# Patient Record
Sex: FEMALE | Race: WHITE | NOT HISPANIC OR LATINO | Employment: UNEMPLOYED | URBAN - METROPOLITAN AREA
[De-identification: names, ages, dates, MRNs, and addresses within clinical notes are randomized per-mention and may not be internally consistent; named-entity substitution may affect disease eponyms.]

---

## 2021-05-12 ENCOUNTER — HOSPITAL ENCOUNTER (EMERGENCY)
Facility: HOSPITAL | Age: 31
Discharge: HOME | End: 2021-05-12
Attending: EMERGENCY MEDICINE
Payer: COMMERCIAL

## 2021-05-12 VITALS
OXYGEN SATURATION: 99 % | RESPIRATION RATE: 20 BRPM | HEIGHT: 62 IN | BODY MASS INDEX: 25.98 KG/M2 | SYSTOLIC BLOOD PRESSURE: 128 MMHG | WEIGHT: 141.19 LBS | HEART RATE: 76 BPM | TEMPERATURE: 99 F | DIASTOLIC BLOOD PRESSURE: 78 MMHG

## 2021-05-12 DIAGNOSIS — R10.11 RIGHT UPPER QUADRANT ABDOMINAL PAIN: Primary | ICD-10-CM

## 2021-05-12 DIAGNOSIS — R10.9 RIGHT SIDED ABDOMINAL PAIN: ICD-10-CM

## 2021-05-12 DIAGNOSIS — R07.9 RIGHT-SIDED CHEST PAIN: ICD-10-CM

## 2021-05-12 LAB
ALBUMIN SERPL BCP-MCNC: 4.3 G/DL (ref 3.5–5.2)
ALP SERPL-CCNC: 65 U/L (ref 55–135)
ALT SERPL W/O P-5'-P-CCNC: 13 U/L (ref 10–44)
ANION GAP SERPL CALC-SCNC: 11 MMOL/L (ref 8–16)
AST SERPL-CCNC: 15 U/L (ref 10–40)
B-HCG UR QL: NEGATIVE
BASOPHILS # BLD AUTO: 0.02 K/UL (ref 0–0.2)
BASOPHILS NFR BLD: 0.3 % (ref 0–1.9)
BILIRUB SERPL-MCNC: 0.9 MG/DL (ref 0.1–1)
BILIRUB UR QL STRIP: NEGATIVE
BUN SERPL-MCNC: 10 MG/DL (ref 6–20)
CALCIUM SERPL-MCNC: 8.9 MG/DL (ref 8.7–10.5)
CHLORIDE SERPL-SCNC: 107 MMOL/L (ref 95–110)
CLARITY UR: CLEAR
CO2 SERPL-SCNC: 21 MMOL/L (ref 23–29)
COLOR UR: YELLOW
CREAT SERPL-MCNC: 0.8 MG/DL (ref 0.5–1.4)
DIFFERENTIAL METHOD: NORMAL
EOSINOPHIL # BLD AUTO: 0.1 K/UL (ref 0–0.5)
EOSINOPHIL NFR BLD: 0.9 % (ref 0–8)
ERYTHROCYTE [DISTWIDTH] IN BLOOD BY AUTOMATED COUNT: 12.4 % (ref 11.5–14.5)
EST. GFR  (AFRICAN AMERICAN): >60 ML/MIN/1.73 M^2
EST. GFR  (NON AFRICAN AMERICAN): >60 ML/MIN/1.73 M^2
GLUCOSE SERPL-MCNC: 84 MG/DL (ref 70–110)
GLUCOSE UR QL STRIP: NEGATIVE
HCT VFR BLD AUTO: 38.8 % (ref 37–48.5)
HCV AB SERPL QL IA: NEGATIVE
HEP C VIRUS HOLD SPECIMEN: NORMAL
HGB BLD-MCNC: 12.5 G/DL (ref 12–16)
HGB UR QL STRIP: NEGATIVE
HIV 1+2 AB+HIV1 P24 AG SERPL QL IA: NEGATIVE
IMM GRANULOCYTES # BLD AUTO: 0.03 K/UL (ref 0–0.04)
IMM GRANULOCYTES NFR BLD AUTO: 0.5 % (ref 0–0.5)
KETONES UR QL STRIP: NEGATIVE
LEUKOCYTE ESTERASE UR QL STRIP: NEGATIVE
LIPASE SERPL-CCNC: 16 U/L (ref 4–60)
LYMPHOCYTES # BLD AUTO: 3.1 K/UL (ref 1–4.8)
LYMPHOCYTES NFR BLD: 47.6 % (ref 18–48)
MCH RBC QN AUTO: 29.6 PG (ref 27–31)
MCHC RBC AUTO-ENTMCNC: 32.2 G/DL (ref 32–36)
MCV RBC AUTO: 92 FL (ref 82–98)
MONOCYTES # BLD AUTO: 0.4 K/UL (ref 0.3–1)
MONOCYTES NFR BLD: 5.7 % (ref 4–15)
NEUTROPHILS # BLD AUTO: 2.9 K/UL (ref 1.8–7.7)
NEUTROPHILS NFR BLD: 45 % (ref 38–73)
NITRITE UR QL STRIP: NEGATIVE
NRBC BLD-RTO: 0 /100 WBC
PH UR STRIP: 7 [PH] (ref 5–8)
PLATELET # BLD AUTO: 257 K/UL (ref 150–450)
PMV BLD AUTO: 9.7 FL (ref 9.2–12.9)
POTASSIUM SERPL-SCNC: 3.9 MMOL/L (ref 3.5–5.1)
PROT SERPL-MCNC: 7.4 G/DL (ref 6–8.4)
PROT UR QL STRIP: NEGATIVE
RBC # BLD AUTO: 4.22 M/UL (ref 4–5.4)
SODIUM SERPL-SCNC: 139 MMOL/L (ref 136–145)
SP GR UR STRIP: <=1.005 (ref 1–1.03)
URN SPEC COLLECT METH UR: ABNORMAL
UROBILINOGEN UR STRIP-ACNC: NEGATIVE EU/DL
WBC # BLD AUTO: 6.51 K/UL (ref 3.9–12.7)

## 2021-05-12 PROCEDURE — 99285 EMERGENCY DEPT VISIT HI MDM: CPT | Mod: 25

## 2021-05-12 PROCEDURE — 86803 HEPATITIS C AB TEST: CPT | Performed by: EMERGENCY MEDICINE

## 2021-05-12 PROCEDURE — 25000003 PHARM REV CODE 250: Performed by: EMERGENCY MEDICINE

## 2021-05-12 PROCEDURE — 80053 COMPREHEN METABOLIC PANEL: CPT | Performed by: EMERGENCY MEDICINE

## 2021-05-12 PROCEDURE — 81025 URINE PREGNANCY TEST: CPT | Performed by: EMERGENCY MEDICINE

## 2021-05-12 PROCEDURE — 81003 URINALYSIS AUTO W/O SCOPE: CPT | Performed by: EMERGENCY MEDICINE

## 2021-05-12 PROCEDURE — 36415 COLL VENOUS BLD VENIPUNCTURE: CPT | Performed by: EMERGENCY MEDICINE

## 2021-05-12 PROCEDURE — 83690 ASSAY OF LIPASE: CPT | Performed by: EMERGENCY MEDICINE

## 2021-05-12 PROCEDURE — 86703 HIV-1/HIV-2 1 RESULT ANTBDY: CPT | Performed by: EMERGENCY MEDICINE

## 2021-05-12 PROCEDURE — 85025 COMPLETE CBC W/AUTO DIFF WBC: CPT | Performed by: EMERGENCY MEDICINE

## 2021-05-12 RX ORDER — ONDANSETRON 2 MG/ML
4 INJECTION INTRAMUSCULAR; INTRAVENOUS
Status: DISCONTINUED | OUTPATIENT
Start: 2021-05-12 | End: 2021-05-12 | Stop reason: HOSPADM

## 2021-05-12 RX ORDER — ACETAMINOPHEN 500 MG
1000 TABLET ORAL
Status: COMPLETED | OUTPATIENT
Start: 2021-05-12 | End: 2021-05-12

## 2021-05-12 RX ORDER — MORPHINE SULFATE 4 MG/ML
4 INJECTION, SOLUTION INTRAMUSCULAR; INTRAVENOUS
Status: DISCONTINUED | OUTPATIENT
Start: 2021-05-12 | End: 2021-05-12 | Stop reason: HOSPADM

## 2021-05-12 RX ORDER — NAPROXEN 375 MG/1
375 TABLET ORAL 2 TIMES DAILY WITH MEALS
Qty: 60 TABLET | Refills: 0 | Status: SHIPPED | OUTPATIENT
Start: 2021-05-12 | End: 2021-05-12 | Stop reason: SDUPTHER

## 2021-05-12 RX ORDER — IBUPROFEN 800 MG/1
800 TABLET ORAL
Status: COMPLETED | OUTPATIENT
Start: 2021-05-12 | End: 2021-05-12

## 2021-05-12 RX ORDER — NAPROXEN 375 MG/1
375 TABLET ORAL 2 TIMES DAILY WITH MEALS
Qty: 60 TABLET | Refills: 0 | Status: SHIPPED | OUTPATIENT
Start: 2021-05-12 | End: 2021-11-15

## 2021-05-12 RX ADMIN — IBUPROFEN 800 MG: 800 TABLET ORAL at 02:05

## 2021-05-12 RX ADMIN — ACETAMINOPHEN 1000 MG: 500 TABLET ORAL at 02:05

## 2021-11-14 RX ORDER — METHOCARBAMOL 750 MG/1
750 TABLET, FILM COATED ORAL EVERY 8 HOURS PRN
COMMUNITY
Start: 2021-07-22 | End: 2023-02-08 | Stop reason: CLARIF

## 2021-11-14 RX ORDER — TIZANIDINE 4 MG/1
4 TABLET ORAL NIGHTLY
COMMUNITY
Start: 2021-06-07 | End: 2023-02-08 | Stop reason: CLARIF

## 2021-11-14 RX ORDER — GABAPENTIN 300 MG/1
600 CAPSULE ORAL 3 TIMES DAILY
COMMUNITY
Start: 2021-06-07 | End: 2023-02-08 | Stop reason: CLARIF

## 2021-11-14 RX ORDER — CHLORZOXAZONE 500 MG/1
500 TABLET ORAL EVERY 8 HOURS PRN
COMMUNITY
Start: 2021-06-07 | End: 2023-02-08 | Stop reason: CLARIF

## 2021-11-14 RX ORDER — OXYCODONE AND ACETAMINOPHEN 5; 325 MG/1; MG/1
1 TABLET ORAL EVERY 6 HOURS PRN
COMMUNITY
Start: 2021-07-14 | End: 2023-02-08 | Stop reason: CLARIF

## 2021-11-14 RX ORDER — CELECOXIB 100 MG/1
CAPSULE ORAL
COMMUNITY
Start: 2021-06-07 | End: 2021-11-15

## 2023-02-07 ENCOUNTER — NURSE TRIAGE (OUTPATIENT)
Dept: ADMINISTRATIVE | Facility: CLINIC | Age: 33
End: 2023-02-07
Payer: COMMERCIAL

## 2023-02-07 NOTE — TELEPHONE ENCOUNTER
"Pt calls c/o upper right quadrant abd pain and upper back pain for the past 10 days. Pt states, "This pain just isn't going away and I'm not sure what to do." Pt describes the pain as "starting in my upper right abdomen and radiating to my upper back, almost like where my kidney is. I've had kidney stones before, but this doesn't feel like that." Pt denies chest pain and SOB. +Family hx of heart disease.    Care Advice recommends that pt go to ED now. Pt verbalizes understanding and is instructed to call back with any new/worsening sxs, questions, or concerns.   Reason for Disposition   Pain lasting > 10 minutes and over 35 years old and at least one cardiac risk factor (i.e., hypertension, diabetes, obesity, smoker or strong family history of heart disease)    Additional Information   Negative: Passed out (i.e., fainted, collapsed and was not responding)   Negative: Shock suspected (e.g., cold/pale/clammy skin, too weak to stand, low BP, rapid pulse)   Negative: Sounds like a life-threatening emergency to the triager   Negative: Chest pain   Pain is mainly in upper abdomen (if needed ask: 'is it mainly above the belly button?')   Negative: Passed out (i.e., fainted, collapsed and was not responding)   Negative: Shock suspected (e.g., cold/pale/clammy skin, too weak to stand, low BP, rapid pulse)   Negative: Visible sweat on face or sweat is dripping down   Negative: SEVERE abdominal pain (e.g., excruciating)   Negative: Pain lasting > 10 minutes and over 50 years old   Negative: Pain lasting > 10 minutes and over 40 years old and associated chest, arm, neck, upper back, or jaw pain    Protocols used: Abdominal Pain - Female-A-OH, Abdominal Pain - Upper-A-OH    "

## 2023-02-08 ENCOUNTER — HOSPITAL ENCOUNTER (EMERGENCY)
Facility: HOSPITAL | Age: 33
Discharge: HOME | End: 2023-02-08
Attending: EMERGENCY MEDICINE
Payer: COMMERCIAL

## 2023-02-08 VITALS
WEIGHT: 149.13 LBS | SYSTOLIC BLOOD PRESSURE: 125 MMHG | BODY MASS INDEX: 27.28 KG/M2 | OXYGEN SATURATION: 98 % | TEMPERATURE: 98 F | HEART RATE: 70 BPM | RESPIRATION RATE: 16 BRPM | DIASTOLIC BLOOD PRESSURE: 79 MMHG

## 2023-02-08 DIAGNOSIS — M54.9 UPPER BACK PAIN: ICD-10-CM

## 2023-02-08 DIAGNOSIS — R10.11 RIGHT UPPER QUADRANT ABDOMINAL PAIN: Primary | ICD-10-CM

## 2023-02-08 LAB
ALBUMIN SERPL BCP-MCNC: 4.2 G/DL (ref 3.5–5.2)
ALP SERPL-CCNC: 85 U/L (ref 55–135)
ALT SERPL W/O P-5'-P-CCNC: 19 U/L (ref 10–44)
ANION GAP SERPL CALC-SCNC: 7 MMOL/L (ref 8–16)
AST SERPL-CCNC: 17 U/L (ref 10–40)
B-HCG UR QL: NEGATIVE
BASOPHILS # BLD AUTO: 0.03 K/UL (ref 0–0.2)
BASOPHILS NFR BLD: 0.4 % (ref 0–1.9)
BILIRUB SERPL-MCNC: 0.3 MG/DL (ref 0.1–1)
BILIRUB UR QL STRIP: NEGATIVE
BUN SERPL-MCNC: 16 MG/DL (ref 6–20)
CALCIUM SERPL-MCNC: 9.5 MG/DL (ref 8.7–10.5)
CHLORIDE SERPL-SCNC: 106 MMOL/L (ref 95–110)
CLARITY UR: CLEAR
CO2 SERPL-SCNC: 26 MMOL/L (ref 23–29)
COLOR UR: YELLOW
CREAT SERPL-MCNC: 0.8 MG/DL (ref 0.5–1.4)
DIFFERENTIAL METHOD: NORMAL
EOSINOPHIL # BLD AUTO: 0 K/UL (ref 0–0.5)
EOSINOPHIL NFR BLD: 0.6 % (ref 0–8)
ERYTHROCYTE [DISTWIDTH] IN BLOOD BY AUTOMATED COUNT: 12 % (ref 11.5–14.5)
EST. GFR  (NO RACE VARIABLE): >60 ML/MIN/1.73 M^2
GLUCOSE SERPL-MCNC: 78 MG/DL (ref 70–110)
GLUCOSE UR QL STRIP: NEGATIVE
HCT VFR BLD AUTO: 40.8 % (ref 37–48.5)
HGB BLD-MCNC: 13.3 G/DL (ref 12–16)
HGB UR QL STRIP: NEGATIVE
IMM GRANULOCYTES # BLD AUTO: 0.02 K/UL (ref 0–0.04)
IMM GRANULOCYTES NFR BLD AUTO: 0.3 % (ref 0–0.5)
KETONES UR QL STRIP: NEGATIVE
LEUKOCYTE ESTERASE UR QL STRIP: NEGATIVE
LIPASE SERPL-CCNC: 25 U/L (ref 4–60)
LYMPHOCYTES # BLD AUTO: 3 K/UL (ref 1–4.8)
LYMPHOCYTES NFR BLD: 43.6 % (ref 18–48)
MCH RBC QN AUTO: 29.4 PG (ref 27–31)
MCHC RBC AUTO-ENTMCNC: 32.6 G/DL (ref 32–36)
MCV RBC AUTO: 90 FL (ref 82–98)
MONOCYTES # BLD AUTO: 0.4 K/UL (ref 0.3–1)
MONOCYTES NFR BLD: 6.4 % (ref 4–15)
NEUTROPHILS # BLD AUTO: 3.3 K/UL (ref 1.8–7.7)
NEUTROPHILS NFR BLD: 48.7 % (ref 38–73)
NITRITE UR QL STRIP: NEGATIVE
NRBC BLD-RTO: 0 /100 WBC
PH UR STRIP: 8 [PH] (ref 5–8)
PLATELET # BLD AUTO: 309 K/UL (ref 150–450)
PMV BLD AUTO: 9.3 FL (ref 9.2–12.9)
POTASSIUM SERPL-SCNC: 4.2 MMOL/L (ref 3.5–5.1)
PROT SERPL-MCNC: 7.5 G/DL (ref 6–8.4)
PROT UR QL STRIP: NEGATIVE
RBC # BLD AUTO: 4.53 M/UL (ref 4–5.4)
SODIUM SERPL-SCNC: 139 MMOL/L (ref 136–145)
SP GR UR STRIP: 1.01 (ref 1–1.03)
URN SPEC COLLECT METH UR: NORMAL
UROBILINOGEN UR STRIP-ACNC: NEGATIVE EU/DL
WBC # BLD AUTO: 6.85 K/UL (ref 3.9–12.7)

## 2023-02-08 PROCEDURE — 80053 COMPREHEN METABOLIC PANEL: CPT | Performed by: NURSE PRACTITIONER

## 2023-02-08 PROCEDURE — 81025 URINE PREGNANCY TEST: CPT | Performed by: NURSE PRACTITIONER

## 2023-02-08 PROCEDURE — 25000003 PHARM REV CODE 250: Performed by: NURSE PRACTITIONER

## 2023-02-08 PROCEDURE — 85025 COMPLETE CBC W/AUTO DIFF WBC: CPT | Performed by: NURSE PRACTITIONER

## 2023-02-08 PROCEDURE — 83690 ASSAY OF LIPASE: CPT | Performed by: NURSE PRACTITIONER

## 2023-02-08 PROCEDURE — 81003 URINALYSIS AUTO W/O SCOPE: CPT | Performed by: NURSE PRACTITIONER

## 2023-02-08 PROCEDURE — 99284 EMERGENCY DEPT VISIT MOD MDM: CPT | Mod: 25

## 2023-02-08 RX ORDER — IBUPROFEN 600 MG/1
600 TABLET ORAL EVERY 6 HOURS PRN
Qty: 28 TABLET | Refills: 0 | Status: SHIPPED | OUTPATIENT
Start: 2023-02-08

## 2023-02-08 RX ORDER — METHOCARBAMOL 500 MG/1
500 TABLET, FILM COATED ORAL 3 TIMES DAILY PRN
Qty: 30 TABLET | Refills: 0 | Status: SHIPPED | OUTPATIENT
Start: 2023-02-08

## 2023-02-08 RX ORDER — ONDANSETRON 4 MG/1
4 TABLET, ORALLY DISINTEGRATING ORAL
Status: COMPLETED | OUTPATIENT
Start: 2023-02-08 | End: 2023-02-08

## 2023-02-08 RX ADMIN — ONDANSETRON 4 MG: 4 TABLET, ORALLY DISINTEGRATING ORAL at 09:02

## 2023-02-08 NOTE — ED PROVIDER NOTES
"SCRIBE #1 NOTE: I, Irma Magaña, am scribing for, and in the presence of, Doris Hernandez DO. I have scribed the entire note.      History      Chief Complaint   Patient presents with    Abdominal Pain     Pt reports upper abdominal pain that radiates to her upper back x 11 days. Pt also reports nausea and "fluffy" stools.        Review of patient's allergies indicates:  No Known Allergies     HPI   HPI    2/8/2023, 11:16 AM   History obtained from the patient      History of Present Illness: Anusha Mckeon is a 32 y.o. female patient who presents to the Emergency Department for dull, RUQ abdominal pain which onset 11 days PTA and has been constant for 3 days. Pt initially believed that she had a stomach virus because the rest of her family had one, but she states that she did not have N/V/D like the rest of her family and her sxs have not improved. The pt reports that she has had to have bowel movements more frequently and that her stools are mucousy and fluffy, but she denies experiencing any diarrhea. She also denies experiencing any sharp, stabbing, ripping, or tearing pain in her abdomen. Symptoms are constant and moderate in severity. No mitigating or exacerbating factors reported. Associated sxs include mucousy stools and R upper back pain that radiates down her RUE. Patient denies any fever, chills, cough, SOB, leg swelling, dysuria, hematuria, N/V/D, and all other sxs at this time. No prior Tx reported. Pt reports that her LNMP was 3 weeks ago. She also reports that her mother has a history of diverticulitis. No further complaints or concerns at this time.   Patient is currently breast feeding.         Arrival mode: Personal vehicle      PCP: Shahzad Forte MD       Past Medical History:  Past Medical History:   Diagnosis Date    Abnormal glucose tolerance of mother affecting pregnancy, childbirth, or puerperium 9/16/2021 10:47:13 AM    [facility] Historical - Unknown: " Gestational diabetes mellitus (GDM)-No Additional Notes    Ovarian cyst     Left ovary       Past Surgical History:  Past Surgical History:   Procedure Laterality Date    CARPAL TUNNEL RELEASE      CERVICAL DISC SURGERY      CYST REMOVAL      WRIST SURGERY           Family History:  Family History   Problem Relation Age of Onset    Diabetes Paternal Grandmother     Hypertension Paternal Grandmother     Ovarian cancer Maternal Grandmother     Cancer Maternal Grandmother         skin    Hypertension Maternal Grandmother     Stroke Maternal Grandmother     Breast cancer Neg Hx     Colon cancer Neg Hx     Eclampsia Neg Hx     Miscarriages / Stillbirths Neg Hx      labor Neg Hx        Social History:  Social History     Tobacco Use    Smoking status: Never    Smokeless tobacco: Never   Substance and Sexual Activity    Alcohol use: Yes     Comment: socially     Drug use: No    Sexual activity: Yes     Partners: Male     Birth control/protection: None       ROS   Review of Systems   Constitutional:  Negative for chills and fever.   HENT:  Negative for sore throat.    Respiratory:  Negative for cough and shortness of breath.    Cardiovascular:  Negative for chest pain and leg swelling.   Gastrointestinal:  Positive for abdominal pain (RUQ). Negative for diarrhea, nausea and vomiting.        (+) mucousy stools   Genitourinary:  Negative for dysuria and hematuria.   Musculoskeletal:  Positive for back pain (R upper radiating down the RUE) and myalgias (RUE pain radiating from the back).   Skin:  Negative for rash.   Neurological:  Negative for weakness.   Hematological:  Does not bruise/bleed easily.   All other systems reviewed and are negative.    Physical Exam      Initial Vitals [23 0805]   BP Pulse Resp Temp SpO2   125/79 75 18 97.8 °F (36.6 °C) (!) 74 %      MAP       --          Physical Exam  Skin:           Nursing Notes and Vital Signs Reviewed.  Constitutional: Patient is in no acute distress.  Well-developed and well-nourished.  Head: Atraumatic. Normocephalic.  Eyes: PERRL. EOM intact. Conjunctivae are not pale. No scleral icterus.  ENT: Mucous membranes are moist. Oropharynx is clear and symmetric.    Neck: Supple. Full ROM. No lymphadenopathy.  Cardiovascular: Regular rate. Regular rhythm. No murmurs, rubs, or gallops. Distal pulses are 2+ and symmetric.  Pulmonary/Chest: No respiratory distress. Clear to auscultation bilaterally. No wheezing or rales.  Abdominal: Soft and non-distended.  There is no tenderness.  No rebound, guarding, or rigidity.   Musculoskeletal: Moves all extremities. No obvious deformities. No edema.  Skin: Warm and dry.  Neurological:  Alert, awake, and appropriate.  Normal speech.  No acute focal neurological deficits are appreciated.  Intact median, ulnar, and radial nerves both motor and sensory  Psychiatric: Normal affect. Good eye contact. Appropriate in content.    ED Course    Procedures  ED Vital Signs:  Vitals:    02/08/23 0805 02/08/23 1132   BP: 125/79    Pulse: 75 70   Resp: 18 16   Temp: 97.8 °F (36.6 °C)    TempSrc: Oral    SpO2: (!) 74% 98%   Weight: 67.7 kg (149 lb 2.3 oz)        Abnormal Lab Results:  Labs Reviewed   COMPREHENSIVE METABOLIC PANEL - Abnormal; Notable for the following components:       Result Value    Anion Gap 7 (*)     All other components within normal limits   CBC W/ AUTO DIFFERENTIAL   LIPASE   URINALYSIS, REFLEX TO URINE CULTURE    Narrative:     Specimen Source->Urine   PREGNANCY TEST, URINE RAPID    Narrative:     Specimen Source->Urine        All Lab Results:  Results for orders placed or performed during the hospital encounter of 02/08/23   CBC auto differential   Result Value Ref Range    WBC 6.85 3.90 - 12.70 K/uL    RBC 4.53 4.00 - 5.40 M/uL    Hemoglobin 13.3 12.0 - 16.0 g/dL    Hematocrit 40.8 37.0 - 48.5 %    MCV 90 82 - 98 fL    MCH 29.4 27.0 - 31.0 pg    MCHC 32.6 32.0 - 36.0 g/dL    RDW 12.0 11.5 - 14.5 %    Platelets 309 150 -  450 K/uL    MPV 9.3 9.2 - 12.9 fL    Immature Granulocytes 0.3 0.0 - 0.5 %    Gran # (ANC) 3.3 1.8 - 7.7 K/uL    Immature Grans (Abs) 0.02 0.00 - 0.04 K/uL    Lymph # 3.0 1.0 - 4.8 K/uL    Mono # 0.4 0.3 - 1.0 K/uL    Eos # 0.0 0.0 - 0.5 K/uL    Baso # 0.03 0.00 - 0.20 K/uL    nRBC 0 0 /100 WBC    Gran % 48.7 38.0 - 73.0 %    Lymph % 43.6 18.0 - 48.0 %    Mono % 6.4 4.0 - 15.0 %    Eosinophil % 0.6 0.0 - 8.0 %    Basophil % 0.4 0.0 - 1.9 %    Differential Method Automated    Comprehensive metabolic panel   Result Value Ref Range    Sodium 139 136 - 145 mmol/L    Potassium 4.2 3.5 - 5.1 mmol/L    Chloride 106 95 - 110 mmol/L    CO2 26 23 - 29 mmol/L    Glucose 78 70 - 110 mg/dL    BUN 16 6 - 20 mg/dL    Creatinine 0.8 0.5 - 1.4 mg/dL    Calcium 9.5 8.7 - 10.5 mg/dL    Total Protein 7.5 6.0 - 8.4 g/dL    Albumin 4.2 3.5 - 5.2 g/dL    Total Bilirubin 0.3 0.1 - 1.0 mg/dL    Alkaline Phosphatase 85 55 - 135 U/L    AST 17 10 - 40 U/L    ALT 19 10 - 44 U/L    Anion Gap 7 (L) 8 - 16 mmol/L    eGFR >60 >60 mL/min/1.73 m^2   Lipase   Result Value Ref Range    Lipase 25 4 - 60 U/L   Urinalysis, Reflex to Urine Culture Urine, Clean Catch    Specimen: Urine   Result Value Ref Range    Specimen UA Urine, Clean Catch     Color, UA Yellow Yellow, Straw, Mireille    Appearance, UA Clear Clear    pH, UA 8.0 5.0 - 8.0    Specific Gravity, UA 1.015 1.005 - 1.030    Protein, UA Negative Negative    Glucose, UA Negative Negative    Ketones, UA Negative Negative    Bilirubin (UA) Negative Negative    Occult Blood UA Negative Negative    Nitrite, UA Negative Negative    Urobilinogen, UA Negative <2.0 EU/dL    Leukocytes, UA Negative Negative   Pregnancy, urine rapid   Result Value Ref Range    Preg Test, Ur Negative          Imaging Results:  Imaging Results              X-Ray Chest PA And Lateral (Final result)  Result time 02/08/23 11:41:10      Final result by Joe Reid MD (02/08/23 11:41:10)                   Impression:       Clear chest without acute abnormality.  Similar appearance.  Consider further evaluation as warranted.      Electronically signed by: Joe Reid  Date:    02/08/2023  Time:    11:41               Narrative:    EXAMINATION:  XR CHEST PA AND LATERAL    CLINICAL HISTORY:  Dorsalgia, unspecified    TECHNIQUE:  PA and lateral views of the chest were performed.    COMPARISON:  Chest radiograph 05/12/2020    FINDINGS:  The lungs are clear, with normal appearance of pulmonary vasculature and no pleural effusion or pneumothorax.    The cardiac silhouette is normal in size. The hilar and mediastinal contours are unremarkable.    Bones are intact. Probable postoperative changes at the lower cervical spine.                                       US Abdomen Limited (Final result)  Result time 02/08/23 09:03:34      Final result by BAMBI Mackey Sr., MD (02/08/23 09:03:34)                   Impression:      1. There is a small extrarenal pelvis associated with the right kidney.  2. Otherwise, normal study.      Electronically signed by: Joe Mackey MD  Date:    02/08/2023  Time:    09:03               Narrative:    EXAMINATION:  US ABDOMEN LIMITED    CLINICAL HISTORY:  right upper quadrant abdominal pain;    TECHNIQUE:  Multiple static ultrasound images are submitted for interpretation with color flow and spectral Doppler imaging.    COMPARISON:  05/12/2021    FINDINGS:  The liver is normal in appearance. It measures 14.7 cm in length. There is no intrahepatic biliary ductal dilatation. The common bile duct has a diameter of 1 mm. The gallbladder is normal in appearance. The visualized portion of the pancreas is normal in appearance. The right kidney measures 9.7 cm in length.  There is a small extrarenal pelvis associated with the right kidney.  There is no nephrolithiasis.  The inferior vena cava is normal in appearance. The main portal vein has a venous waveform with flow directed towards the liver. It has a  "velocity of 30 cm/sec.                                     ED: Independent evaluation of chest x-ray:  Normal cardiac silhouette.  No pneumothorax.         The Emergency Provider reviewed the vital signs and test results, which are outlined above.    ED Discussion     12:20 PM: Reassessed pt at this time. Discussed with pt all pertinent ED information and results. Discussed pt dx and plan of tx. Gave pt all f/u and return to the ED instructions. All questions and concerns were addressed at this time. Pt expresses understanding of information and instructions, and is comfortable with plan to discharge. Pt is stable for discharge.    I discussed with patient and/or family/caretaker that evaluation in the ED does not suggest any emergent or life threatening medical conditions requiring immediate intervention beyond what was provided in the ED, and I believe patient is safe for discharge.  Regardless, an unremarkable evaluation in the ED does not preclude the development or presence of a serious of life threatening condition. As such, patient was instructed to return immediately for any worsening or change in current symptoms.      ED Course as of 02/08/23 1222   Wed Feb 08, 2023   1059 Nurse Triage Note Reviewed from earlier today:  Note  Pt calls c/o upper right quadrant abd pain and upper back pain for the past 10 days. Pt states, "This pain just isn't going away and I'm not sure what to do." Pt describes the pain as "starting in my upper right abdomen and radiating to my upper back, almost like where my kidney is. I've had kidney stones before, but this doesn't feel like that." Pt denies chest pain and SOB. +Family hx of heart disease.       [LB]   1133 Preg Test, Ur: Negative  Long discussion with patient regarding plan of care.  Patient is currently breastfeeding.  She is afraid to take too much medication as this could affect her breast milk.  We discussed that it would be okay to use ibuprofen.  We recommended " taking ibuprofen over the next 24 to 36 hours.  We also discussed using warm moist heat.. [LB]      ED Course User Index  [LB] Doris Hernandez,        ED Medication(s):  Medications   ondansetron disintegrating tablet 4 mg (4 mg Oral Given 2/8/23 0932)     New Prescriptions    IBUPROFEN (ADVIL,MOTRIN) 600 MG TABLET    Take 1 tablet (600 mg total) by mouth every 6 (six) hours as needed for Pain.        Follow-up Information       Shahzad Forte MD In 2 days.    Specialty: Family Medicine  Why: Return to emergency department for:  Chest pain, chest pressure, vomiting, difficulty breathing, passing out, blood in stool, black tarry stool, or other concerns  Contact information:  [address] [facility] [address] [phone]                               Medical Decision Making    Medical Decision Making:   Clinical Tests:   Lab Tests: Ordered and Reviewed  Radiological Study: Ordered and Reviewed   Additional MDM:     Well's Criteria Score:  -Clinical symptoms of DVT (leg swelling, pain with palpation) = 0.0  -Other diagnosis less likely than pulmonary embolism =            0.0  -Heart Rate >100 =   0.0  -Immobilization (= or > than 3 days) or surgery in the previous 4 weeks = 0.0  -Previous DVT/PE = 0.0  -Hemoptysis =          0.0  -Malignancy =           0.0  Well's Probability Score =    0          Medical Decision Making     Discussed with Independent Historian/Old Records: [x] No.   [] Yes -  see prior documentation.    Comorbid Medical Conditions Considered:  Breast feeding.      Differential Diagnoses: Based on the available information and the initial assessment, the working DIFFERENTIAL DIAGNOSIS considered during this evaluation included, but not limited to:    Biliary Colic, Cholecystitis, Dyspepsia/Gerd, and musculoskeletal pain.                        Xrays:  INDEPENDENT ORDERED and  REVIEWED: [x] Yes       [] No     []  N/A                              INDEPENDENTLY  INTERPRETED:  [x] Yes, see prior documentation      [] No    []  N/A    EKG:    INDEPENDENT ORDERED and  REVIEWED and INDEPENDENTLY INTERPRETED   :  N/A    Notable Abnormal Lab:  Notable abnormal findings from our INDEPENDENT REVIEW of ORDERED LABS include:     Social Determinates: Factored in the Treatment and Disposition of patient included:      ED Course:  Patient complaining right upper abdominal pain that radiates to the upper back.  Been constant for the past 3 days.  No calf pain, calf tenderness, immobilization, no birth control, no prior DVT, no prior PE.  Not associated with shortness of breath.  Not worse with deep breaths.  No fever, blood in stool, black tarry stool, urinary complaints.  Ultrasound negative.  Chest x-ray no infiltrate, wide mediastinum, or pneumothorax.  Urine normal.  Likely musculoskeletal.  Encouraged taking ibuprofen every six-to-eight hours.  Possible addition of Robaxin.  Discussed muscle stretches.    Discussed case with:    Scribe Attestation:   Scribe #1: I performed the above scribed service and the documentation accurately describes the services I performed. I attest to the accuracy of the note.    Attending:   Physician Attestation Statement for Scribe #1: I, Doris Hernandez DO, personally performed the services described in this documentation, as scribed by Irma Magaña, in my presence, and it is both accurate and complete.          Clinical Impression       ICD-10-CM ICD-9-CM   1. Right upper quadrant abdominal pain  R10.11 789.01   2. Upper back pain  M54.9 724.5       Disposition:   Disposition: Discharged  Condition: Stable     Doris Hernandez DO  02/08/23 1306

## 2023-02-08 NOTE — Clinical Note
"Anusha Dukespablo Mckeon was seen and treated in our emergency department on 2/8/2023.  She may return to work on 02/09/2023.       If you have any questions or concerns, please don't hesitate to call.      Doris Hernandez, DO"

## 2023-02-08 NOTE — FIRST PROVIDER EVALUATION
Medical screening examination initiated.  I have conducted a focused provider triage encounter, findings are as follows:    Brief history of present illness:  Patient presents with right upper quadrant pain radiating to the right upper back.  Also reports nausea but denies any vomiting.  Denies any urinary symptoms.    Vitals:    02/08/23 0805   BP: 125/79   BP Location: Right arm   Patient Position: Sitting   Pulse: 75   Resp: 18   Temp: 97.8 °F (36.6 °C)   TempSrc: Oral   SpO2: (!) 74%   Weight: 67.7 kg (149 lb 2.3 oz)       Pertinent physical exam:      Brief workup plan:      Preliminary workup initiated; this workup will be continued and followed by the physician or advanced practice provider that is assigned to the patient when roomed.

## 2023-03-17 ENCOUNTER — NURSE TRIAGE (OUTPATIENT)
Dept: ADMINISTRATIVE | Facility: CLINIC | Age: 33
End: 2023-03-17
Payer: COMMERCIAL

## 2023-03-17 NOTE — TELEPHONE ENCOUNTER
Patient states she was grocery shopping while wearing her 20 lb child in a sling on the front of her chest. She bent forward to  dog food and place in the cart; at the same time, her child made a sudden movement. This caused a popping sensation in the center of her spine. Now she is experiencing severe pain that starts in the center of her back and radiates up towards her neck. She states she is able to turn her neck, but is not able to twist or bend. Taking a deep breath also hurts. She has taken extra strength Tylenol as well as ibuprofen with no relief of symptoms. Care advice is to be seen today in the office; no PCP with [facility]. Advised to contact PCP for appt or go to [facility].     Reason for Disposition   SEVERE back pain (e.g., excruciating, unable to do any normal activities) and not improved after pain medicine and CARE ADVICE    Additional Information   Negative: Passed out (i.e., fainted, collapsed and was not responding)   Negative: Shock suspected (e.g., cold/pale/clammy skin, too weak to stand, low BP, rapid pulse)   Negative: Sounds like a life-threatening emergency to the triager   Negative: SEVERE back pain of sudden onset and age > 60 years   Negative: SEVERE abdominal pain (e.g., excruciating)   Negative: Abdominal pain and age > 60 years   Negative: Unable to urinate (or only a few drops) and bladder feels very full   Negative: Loss of bladder or bowel control (urine or bowel incontinence; wetting self, leaking stool) of new-onset   Negative: Numbness (loss of sensation) in groin or rectal area   Negative: Pain radiates into groin, scrotum   Negative: Blood in urine (red, pink, or tea-colored)   Negative: Vomiting and pain over lower ribs of back (i.e., flank - kidney area)   Negative: Weakness of a leg or foot (e.g., unable to bear weight, dragging foot)   Negative: Patient sounds very sick or weak to the triager   Negative: Fever > 100.4 F (38.0 C) and flank pain   Negative: Pain or burning  with passing urine (urination)    Protocols used: Back Pain-A-OH

## 2023-09-21 ENCOUNTER — NURSE TRIAGE (OUTPATIENT)
Dept: ADMINISTRATIVE | Facility: CLINIC | Age: 33
End: 2023-09-21
Payer: COMMERCIAL

## 2023-09-21 NOTE — TELEPHONE ENCOUNTER
"Patient states that she was "horsing around" yesterday and fell into her recliner. Her chest made impact with the recliner and "took the wind out of her." Currently c/o chest pain 4/10, tenderness at the site, pain with deep breathing, and pain with changing positions. Denies difficulty breathing. Advised per protocol to continue to monitor symptoms at home.           Reason for Disposition   Chest wall swelling, bruise, or pain from direct blow to chest    Additional Information   Negative: Major injury from dangerous force or speed (e.g., MVA, fall > 10 feet or 3 meters)   Negative: Bullet wound, knife wound or other penetrating object   Negative: Puncture wound that sounds life-threatening to the triager   Negative: SEVERE difficulty breathing (e.g., struggling for each breath, speaks in single words)   Negative: Major bleeding (actively dripping or spurting) that can't be stopped   Negative: Open wound of the chest with sound of moving air (sucking wound) or visible air bubbles   Negative: Shock suspected (e.g., cold/pale/clammy skin, too weak to stand, low BP, rapid pulse)   Negative: Coughing or spitting up blood   Negative: Bluish (or gray) lips or face   Negative: Unconscious or was unconscious   Negative: Sounds like a life-threatening emergency to the triager   Negative: SEVERE chest pain   Negative: Difficulty breathing and not severe   Negative: Skin is split open or gaping   Negative: Bleeding won't stop after 10 minutes of direct pressure (using correct technique)   Negative: Sounds like a serious injury to the triager   Negative: Can't take a deep breath but no respiratory distress (e.g., hurts to take a deep breath)   Negative: Shallow puncture wound   Negative: Collarbone is painful and difficulty raising arm   Negative: Patient is confused or is an unreliable provider of information (e.g., dementia, severe intellectual disability, alcohol intoxication)   Negative: No prior tetanus shots (or is not " fully vaccinated) and any wound (e.g., cut or scrape)   Negative: HIV positive or severe immunodeficiency (severely weak immune system) and DIRTY cut or scrape   Negative: Patient wants to be seen   Negative: Last tetanus shot > 5 years ago and DIRTY cut or scrape   Negative: Last tetanus shot > 10 years ago and CLEAN cut or scrape   Negative: MODERATE pain (e.g., interferes with normal activities) and high-risk adult (e.g., age > 60 years, osteoporosis, chronic steroid use)   Negative: Suspicious history for the injury   Negative: Large swelling or bruise and size > palm of person's hand   Negative: Injury and pain has not improved after 3 days   Negative: Injury is still painful or swollen after 2 weeks    Protocols used: Chest Injury-A-OH

## 2023-10-03 ENCOUNTER — NURSE TRIAGE (OUTPATIENT)
Dept: ADMINISTRATIVE | Facility: CLINIC | Age: 33
End: 2023-10-03
Payer: COMMERCIAL

## 2023-10-03 NOTE — TELEPHONE ENCOUNTER
Pt calls stating that she fractured her 5th rib on her left side about two weeks ago and today she started feeling pain in her left shoulder blade. Her left ring and pinky finger started to feel numb after the shoulder pain started. She still has full ROM of both left ring and pinky fingers. She denies any swelling or bruising. Denies severe difficulty breathing but states that it is difficult to take deep breaths, sneeze, cough, or laugh. Pt rates her current pain level at a 5/10.     Care Advice recommends that pt be seen in ED/UCC now. Pt verbalizes understanding and is instructed to call back with any new/worsening sxs, questions, or concerns.   Reason for Disposition   Can't take a deep breath but no respiratory distress (e.g., hurts to take a deep breath)    Additional Information   Negative: SEVERE difficulty breathing (e.g., struggling for each breath, speaks in single words)   Negative: Difficult to awaken or acting confused (e.g., disoriented, slurred speech)   Negative: Shock suspected (e.g., cold/pale/clammy skin, too weak to stand, low BP, rapid pulse)   Followed an injury to chest   Negative: Major injury from dangerous force or speed (e.g., MVA, fall > 10 feet or 3 meters)   Negative: Bullet wound, knife wound or other penetrating object   Negative: Puncture wound that sounds life-threatening to the triager   Negative: SEVERE difficulty breathing (e.g., struggling for each breath, speaks in single words)   Negative: Major bleeding (actively dripping or spurting) that can't be stopped   Negative: Open wound of the chest with sound of moving air (sucking wound) or visible air bubbles   Negative: Shock suspected (e.g., cold/pale/clammy skin, too weak to stand, low BP, rapid pulse)   Negative: Coughing or spitting up blood   Negative: Bluish (or gray) lips or face   Negative: Unconscious or was unconscious   Negative: Sounds like a life-threatening emergency to the triager   Negative: SEVERE chest pain      5/10 pain   Negative: Difficulty breathing and not severe   Negative: Skin is split open or gaping   Negative: Bleeding won't stop after 10 minutes of direct pressure (using correct technique)   Negative: Sounds like a serious injury to the triager    Protocols used: Chest Pain-A-OH, Chest Injury-A-OH

## 2025-04-02 ENCOUNTER — HOSPITAL ENCOUNTER (EMERGENCY)
Facility: HOSPITAL | Age: 35
Discharge: HOME | End: 2025-04-03
Attending: EMERGENCY MEDICINE
Payer: COMMERCIAL

## 2025-04-02 DIAGNOSIS — R06.02 SHORTNESS OF BREATH: Primary | ICD-10-CM

## 2025-04-02 LAB
ABSOLUTE EOSINOPHIL (OHS): 0.13 K/UL
ABSOLUTE MONOCYTE (OHS): 0.52 K/UL (ref 0.3–1)
ABSOLUTE NEUTROPHIL COUNT (OHS): 2.34 K/UL (ref 1.8–7.7)
ALBUMIN SERPL BCP-MCNC: 4.3 G/DL (ref 3.5–5.2)
ALP SERPL-CCNC: 68 UNIT/L (ref 40–150)
ALT SERPL W/O P-5'-P-CCNC: 23 UNIT/L (ref 10–44)
ANION GAP (OHS): 6 MMOL/L (ref 8–16)
AST SERPL-CCNC: 17 UNIT/L (ref 11–45)
BASOPHILS # BLD AUTO: 0.02 K/UL
BASOPHILS NFR BLD AUTO: 0.3 %
BILIRUB SERPL-MCNC: 0.4 MG/DL (ref 0.1–1)
BNP SERPL-MCNC: <10 PG/ML (ref 0–99)
BUN SERPL-MCNC: 13 MG/DL (ref 6–20)
CALCIUM SERPL-MCNC: 9.2 MG/DL (ref 8.7–10.5)
CHLORIDE SERPL-SCNC: 107 MMOL/L (ref 95–110)
CO2 SERPL-SCNC: 25 MMOL/L (ref 23–29)
CREAT SERPL-MCNC: 0.8 MG/DL (ref 0.5–1.4)
ERYTHROCYTE [DISTWIDTH] IN BLOOD BY AUTOMATED COUNT: 11.8 % (ref 11.5–14.5)
GFR SERPLBLD CREATININE-BSD FMLA CKD-EPI: >60 ML/MIN/1.73/M2
GLUCOSE SERPL-MCNC: 96 MG/DL (ref 70–110)
HCT VFR BLD AUTO: 38.5 % (ref 37–48.5)
HCV AB SERPL QL IA: NEGATIVE
HGB BLD-MCNC: 12.9 GM/DL (ref 12–16)
HIV 1+2 AB+HIV1 P24 AG SERPL QL IA: NEGATIVE
IMM GRANULOCYTES # BLD AUTO: 0.01 K/UL (ref 0–0.04)
IMM GRANULOCYTES NFR BLD AUTO: 0.1 % (ref 0–0.5)
INFLUENZA A MOLECULAR (OHS): NEGATIVE
INFLUENZA B MOLECULAR (OHS): NEGATIVE
LYMPHOCYTES # BLD AUTO: 4.51 K/UL (ref 1–4.8)
MCH RBC QN AUTO: 30.2 PG (ref 27–31)
MCHC RBC AUTO-ENTMCNC: 33.5 G/DL (ref 32–36)
MCV RBC AUTO: 90 FL (ref 82–98)
NUCLEATED RBC (/100WBC) (OHS): 0 /100 WBC
PLATELET # BLD AUTO: 314 K/UL (ref 150–450)
PMV BLD AUTO: 8.7 FL (ref 9.2–12.9)
POTASSIUM SERPL-SCNC: 4 MMOL/L (ref 3.5–5.1)
PROT SERPL-MCNC: 7.8 GM/DL (ref 6–8.4)
RBC # BLD AUTO: 4.27 M/UL (ref 4–5.4)
RELATIVE EOSINOPHIL (OHS): 1.7 %
RELATIVE LYMPHOCYTE (OHS): 59.9 % (ref 18–48)
RELATIVE MONOCYTE (OHS): 6.9 % (ref 4–15)
RELATIVE NEUTROPHIL (OHS): 31.1 % (ref 38–73)
SARS-COV-2 RDRP RESP QL NAA+PROBE: NEGATIVE
SODIUM SERPL-SCNC: 138 MMOL/L (ref 136–145)
TROPONIN I SERPL DL<=0.01 NG/ML-MCNC: <0.006 NG/ML
WBC # BLD AUTO: 7.53 K/UL (ref 3.9–12.7)

## 2025-04-02 PROCEDURE — 99285 EMERGENCY DEPT VISIT HI MDM: CPT | Mod: 25

## 2025-04-02 PROCEDURE — 85025 COMPLETE CBC W/AUTO DIFF WBC: CPT

## 2025-04-02 PROCEDURE — 84484 ASSAY OF TROPONIN QUANT: CPT

## 2025-04-02 PROCEDURE — 86803 HEPATITIS C AB TEST: CPT | Performed by: EMERGENCY MEDICINE

## 2025-04-02 PROCEDURE — 93005 ELECTROCARDIOGRAM TRACING: CPT

## 2025-04-02 PROCEDURE — 93010 ELECTROCARDIOGRAM REPORT: CPT | Mod: ,,, | Performed by: INTERNAL MEDICINE

## 2025-04-02 PROCEDURE — 80053 COMPREHEN METABOLIC PANEL: CPT

## 2025-04-02 PROCEDURE — 87502 INFLUENZA DNA AMP PROBE: CPT

## 2025-04-02 PROCEDURE — U0002 COVID-19 LAB TEST NON-CDC: HCPCS

## 2025-04-02 PROCEDURE — 87389 HIV-1 AG W/HIV-1&-2 AB AG IA: CPT | Performed by: EMERGENCY MEDICINE

## 2025-04-02 PROCEDURE — 83880 ASSAY OF NATRIURETIC PEPTIDE: CPT

## 2025-04-03 VITALS
OXYGEN SATURATION: 99 % | RESPIRATION RATE: 16 BRPM | WEIGHT: 130.81 LBS | TEMPERATURE: 98 F | BODY MASS INDEX: 24.07 KG/M2 | HEART RATE: 69 BPM | HEIGHT: 62 IN | SYSTOLIC BLOOD PRESSURE: 117 MMHG | DIASTOLIC BLOOD PRESSURE: 73 MMHG

## 2025-04-03 LAB
B-HCG UR QL: NEGATIVE
OHS QRS DURATION: 88 MS
OHS QTC CALCULATION: 421 MS

## 2025-04-03 PROCEDURE — 25500020 PHARM REV CODE 255: Performed by: EMERGENCY MEDICINE

## 2025-04-03 PROCEDURE — 81025 URINE PREGNANCY TEST: CPT | Performed by: EMERGENCY MEDICINE

## 2025-04-03 RX ADMIN — IOHEXOL 100 ML: 350 INJECTION, SOLUTION INTRAVENOUS at 01:04

## 2025-04-03 NOTE — ED PROVIDER NOTES
"SCRIBE #1 NOTE: I, Liang Gomes, am scribing for, and in the presence of, Niles Tripp DO. I have scribed the entire note.       History     Chief Complaint   Patient presents with    Shortness of Breath     States "feels like I'm breathing menthol and gravity doubled" c/o painful inspirations with SMITH and dizziness. Denies cardiac hx. Recent exposure to influenza A, denies flu symptoms. Per  "this has been going on for a while" c/o sharp pains on inspiration in [place] denies birth control, denies recent travel.      Review of patient's allergies indicates:  No Known Allergies      History of Present Illness     HPI    4/2/2025, 11:41 PM  History obtained from the patient and medical records      History of Present Illness: Anusha Cardozo is a 34 y.o. female patient with a PMHx of ovarian cyst  who presents to the Emergency Department for evaluation of SOB secondary to pain with deep breaths which is chronic and intermittent, with her latest episode having started a few days and constant since. Pt reports it feels cold "like menthol" when breathing. Associated sxs include nausea, dizziness, R leg pain behind the knee, and chest tightness. Patient denies any cough, vomiting, and all other sxs at this time. No prior Tx specified.  No further complaints or concerns at this time.       Arrival mode: Personal Transportation    PCP: Shahzad Forte MD        Past Medical History:  Past Medical History:   Diagnosis Date    Cancer screening 11/26/2024    [facility] My[facility]- MUTYH    Ovarian cyst     Left ovary       Past Surgical History:  Past Surgical History:   Procedure Laterality Date    CARPAL TUNNEL RELEASE      CERVICAL DISC SURGERY      CHOLECYSTECTOMY      CYST REMOVAL      ENDOMETRIAL ABLATION  10/2023    EXCISIONAL HEMORRHOIDECTOMY      HERNIA REPAIR      WRIST SURGERY           Family History:  Family History   Problem Relation Name Age of Onset    Diabetes Paternal Grandmother      " "Hypertension Paternal Grandmother      Ovarian cancer Maternal Grandmother      Cancer Maternal Grandmother          skin    Hypertension Maternal Grandmother      Stroke Maternal Grandmother      Colon cancer Maternal Grandfather      Breast cancer Neg Hx      Eclampsia Neg Hx      Miscarriages / Stillbirths Neg Hx       labor Neg Hx         Social History:  Social History     Tobacco Use    Smoking status: Never    Smokeless tobacco: Never   Substance and Sexual Activity    Alcohol use: Yes     Comment: socially     Drug use: No    Sexual activity: Yes     Partners: Male     Birth control/protection: Partner-Vasectomy        Review of Systems     Review of Systems   Respiratory:  Positive for chest tightness and shortness of breath (secondary to pain when breathing; cold "like menthol"). Negative for cough.    Gastrointestinal:  Positive for nausea. Negative for vomiting.   Musculoskeletal:  Positive for myalgias (R leg, behind knee).   Neurological:  Positive for dizziness.        Physical Exam     Initial Vitals [25]   BP Pulse Resp Temp SpO2   (!) 152/90 80 18 98.4 °F (36.9 °C) 100 %      MAP       --          Physical Exam  Cardiovascular:      Rate and Rhythm: Normal rate and regular rhythm.      Heart sounds: No murmur heard.  Pulmonary:      Effort: Pulmonary effort is normal.      Breath sounds: No wheezing.   Abdominal:      Palpations: Abdomen is soft.      Tenderness: There is no abdominal tenderness.   Skin:     General: Skin is warm and dry.      Findings: No rash.   Neurological:      General: No focal deficit present.      Mental Status: She is oriented to person, place, and time.            ED Course   Procedures  ED Vital Signs:  Vitals:    25 1905 25 1907 25 2329 25 0002   BP: (!) 152/90  (!) 149/91 (!) 141/73   Pulse: 80  67 68   Resp: 18  19 19   Temp: 98.4 °F (36.9 °C)      TempSrc: Oral      SpO2: 100%  100% 100%   Weight:  59.3 kg (130 lb 12.8 oz)   " "  Height:  5' 2" (1.575 m)      04/03/25 0132 04/03/25 0232 04/03/25 0302   BP: 133/69 137/77 117/73   Pulse: 73 73 69   Resp: 20 17 16   Temp:      TempSrc:      SpO2: 100% 100% 99%   Weight:      Height:          Abnormal Lab Results:  Labs Reviewed   COMPREHENSIVE METABOLIC PANEL - Abnormal       Result Value    Sodium 138      Potassium 4.0      Chloride 107      CO2 25      Glucose 96      BUN 13      Creatinine 0.8      Calcium 9.2      Protein Total 7.8      Albumin 4.3      Bilirubin Total 0.4      ALP 68      AST 17      ALT 23      Anion Gap 6 (*)     eGFR >60     CBC WITH DIFFERENTIAL - Abnormal    WBC 7.53      RBC 4.27      HGB 12.9      HCT 38.5      MCV 90      MCH 30.2      MCHC 33.5      RDW 11.8      Platelet Count 314      MPV 8.7 (*)     Nucleated RBC 0      Neut % 31.1 (*)     Lymph % 59.9 (*)     Mono % 6.9      Eos % 1.7      Basophil % 0.3      Imm Grans % 0.1      Neut # 2.34      Lymph # 4.51      Mono # 0.52      Eos # 0.13      Baso # 0.02      Imm Grans # 0.01     INFLUENZA A & B BY MOLECULAR - Normal    INFLUENZA A MOLECULAR Negative      INFLUENZA B MOLECULAR  Negative     HEPATITIS C ANTIBODY - Normal    Hep C Ab Interp Negative     HIV 1 / 2 ANTIBODY - Normal    HIV 1/2 Ag/Ab Negative     TROPONIN I - Normal    Troponin-I <0.006     B-TYPE NATRIURETIC PEPTIDE - Normal    BNP <10     SARS-COV-2 RNA AMPLIFICATION, QUAL - Normal    SARS COV-2 Molecular Negative     PREGNANCY TEST, URINE RAPID - Normal    hCG Qualitative, Urine Negative     CBC W/ AUTO DIFFERENTIAL    Narrative:     The following orders were created for panel order CBC Auto Differential.  Procedure                               Abnormality         Status                     ---------                               -----------         ------                     CBC with Differential[[id number]]       Abnormal            Final result                 Please view results for these tests on the individual orders.   HEP C VIRUS " HOLD SPECIMEN        All Lab Results:  Results for orders placed or performed during the hospital encounter of 04/02/25   EKG 12-lead    Collection Time: 04/02/25  7:01 PM   Result Value Ref Range    QRS Duration 88 ms    [facility] QTC Calculation 421 ms   Influenza A & B by Molecular    Collection Time: 04/02/25  9:29 PM    Specimen: Nasopharyngeal Swab   Result Value Ref Range    INFLUENZA A MOLECULAR Negative Negative    INFLUENZA B MOLECULAR  Negative Negative   Hepatitis C Antibody    Collection Time: 04/02/25  9:29 PM   Result Value Ref Range    Hep C Ab Interp Negative Negative   HIV 1/2 Ag/Ab (4th Gen)    Collection Time: 04/02/25  9:29 PM   Result Value Ref Range    HIV 1/2 Ag/Ab Negative Negative   Comprehensive Metabolic Panel    Collection Time: 04/02/25  9:29 PM   Result Value Ref Range    Sodium 138 136 - 145 mmol/L    Potassium 4.0 3.5 - 5.1 mmol/L    Chloride 107 95 - 110 mmol/L    CO2 25 23 - 29 mmol/L    Glucose 96 70 - 110 mg/dL    BUN 13 6 - 20 mg/dL    Creatinine 0.8 0.5 - 1.4 mg/dL    Calcium 9.2 8.7 - 10.5 mg/dL    Protein Total 7.8 6.0 - 8.4 gm/dL    Albumin 4.3 3.5 - 5.2 g/dL    Bilirubin Total 0.4 0.1 - 1.0 mg/dL    ALP 68 40 - 150 unit/L    AST 17 11 - 45 unit/L    ALT 23 10 - 44 unit/L    Anion Gap 6 (L) 8 - 16 mmol/L    eGFR >60 >60 mL/min/1.73/m2   CBC with Differential    Collection Time: 04/02/25  9:29 PM   Result Value Ref Range    WBC 7.53 3.90 - 12.70 K/uL    RBC 4.27 4.00 - 5.40 M/uL    HGB 12.9 12.0 - 16.0 gm/dL    HCT 38.5 37.0 - 48.5 %    MCV 90 82 - 98 fL    MCH 30.2 27.0 - 31.0 pg    MCHC 33.5 32.0 - 36.0 g/dL    RDW 11.8 11.5 - 14.5 %    Platelet Count 314 150 - 450 K/uL    MPV 8.7 (L) 9.2 - 12.9 fL    Nucleated RBC 0 <=0 /100 WBC    Neut % 31.1 (L) 38 - 73 %    Lymph % 59.9 (H) 18 - 48 %    Mono % 6.9 4 - 15 %    Eos % 1.7 <=8 %    Basophil % 0.3 <=1.9 %    Imm Grans % 0.1 0.0 - 0.5 %    Neut # 2.34 1.8 - 7.7 K/uL    Lymph # 4.51 1 - 4.8 K/uL    Mono # 0.52 0.3 - 1 K/uL    Eos #  0.13 <=0.5 K/uL    Baso # 0.02 <=0.2 K/uL    Imm Grans # 0.01 0.00 - 0.04 K/uL   Troponin I    Collection Time: 04/02/25  9:29 PM   Result Value Ref Range    Troponin-I <0.006 <=0.026 ng/mL   Brain natriuretic peptide    Collection Time: 04/02/25  9:29 PM   Result Value Ref Range    BNP <10 0 - 99 pg/mL   COVID-19 Rapid Screening    Collection Time: 04/02/25  9:29 PM   Result Value Ref Range    SARS COV-2 Molecular Negative Negative   Pregnancy, urine rapid    Collection Time: 04/03/25 12:01 AM   Result Value Ref Range    hCG Qualitative, Urine Negative Negative       Imaging Results:  Imaging Results              CTA Chest Non-Coronary (PE Studies) (Final result)  Result time 04/03/25 07:44:21      Final result by Branodn Painter MD (04/03/25 07:44:21)                   Impression:      No acute finding.  No pulmonary embolism.      Electronically signed by: Brandon Painter  Date:    04/03/2025  Time:    07:44               Narrative:    EXAMINATION:  CTA CHEST NON CORONARY (PE STUDIES)    CLINICAL HISTORY:  Pulmonary embolism (PE) suspected, high prob;, chest pain    COMPARISON:  None available.    TECHNIQUE:  Axial CT images were obtained of the chest.  Iterative reconstruction technique was used. The CT exam was performed using one or more of the following dose reduction techniques- Automated exposure control, adjustment of the mA and/or kV according to patient size, and/or use of iterative reconstructed technique.  Low dose axial images were obtained, sagittal and coronal reformations were created.  100 mL Omnipaque 350 IV contrast was administered.  Contrast timing was optimized to evaluate the vascular structures.  MIP images were performed.    FINDINGS:  Coronary artery calcification: None.    Pulmonary Arteries: No pulmonary embolism.    Aorta: No Aneurysm.    Lungs: Clear.    Pleural space:No pleural effusion or pneumothorax.    Heart: Normal size. No pericardial effusion.    Bones/joints: No acute  finding.    Other: No mediastinal or axillary lymphadenopathy.                                       X-Ray Chest 1 View (Final result)  Result time 04/02/25 20:26:48      Final result by Juan King DO (04/02/25 20:26:48)                   Impression:    No acute cardiopulmonary disease.    Finalized on: 4/2/2025 8:26 PM By:  Juan King  [facility]# [id number]      2025-04-02 20:28:48.953     [facility]               Narrative:    Exam: XR CHEST 1 VIEW    Comparison: None    Clinical Indication:  Shortness of breath    Findings: Heart size and pulmonary vasculature are unremarkable.  No focal consolidation, pleural effusions or pneumothorax.    No acute angulated or displaced fractures. Radiopaque density projecting over the lower cervical spine.                                       EXAM: CT Angiography Chest With IV Contrast  IMPRESSION: Unremarkable CTA. No pulmonary embolism  TIME: 0157  RADIOLOGIST: Fredy Noland MD      The EKG was ordered, reviewed, and independently interpreted by the ED provider.  Interpretation time: 1901  Rate: 74 BPM  Rhythm: normal sinus rhythm  Interpretation: No STEMI.             The Emergency Provider reviewed the vital signs and test results, which are outlined above.     ED Discussion     2:48 AM: Reassessed pt at this time. Discussed with patient and/or family/caretaker all pertinent ED information and results. Discussed pt dx and plan of tx. Gave the patient all f/u and return to the ED instructions. All questions and concerns were addressed at this time. Patient and/or family/caretaker expresses understanding of information and instructions, and is comfortable with plan to discharge. Pt is stable for discharge.     I discussed with patient and/or family/caretaker that evaluation in the ED does not suggest any emergent or life threatening medical conditions requiring immediate intervention beyond what was provided in the ED, and I believe patient is safe for discharge.  Regardless,  an unremarkable evaluation in the ED does not preclude the development or presence of a serious of life threatening condition. As such, I instructed that the patient is to return immediately for any worsening or change in current symptoms.    ED Course as of 04/09/25 0130   Thu Apr 03, 2025   0206 Pregnancy, urine rapid  Negative [CD]   0206 Hepatitis C Antibody  Negative [CD]   0206 Influenza A & B by Molecular  Negative [CD]   0206 Comprehensive Metabolic Panel(!)  Nonspecific finding [CD]   0206 HIV 1/2 Ag/Ab (4th Gen)  Negative [CD]   0206 COVID-19 Rapid Screening  Negative [CD]   0206 Brain natriuretic peptide  Within normal limit [CD]   0206 Troponin I  Within normal limits [CD]   0206 CBC Auto Differential(!)  Nonspecific findings [CD]   0206 CTA Chest Non-Coronary (PE Studies)  Unremarkable chest CTA no pulmonary embolism [CD]   0207 X-Ray Chest 1 View  No acute findings [CD]      ED Course User Index  [CD] Niles Tripp, DO     Medical Decision Making  Patient able to tolerate fluids and ambulate with no signs of increased work of breathing, tachypnea, or hypoxia.  Ambulatory referral to pulmonology has been placed.  Instructed to return immediately for any new or worsening symptoms and she verbalized understanding.    Amount and/or Complexity of Data Reviewed  Labs: ordered. Decision-making details documented in ED Course.  Radiology: ordered. Decision-making details documented in ED Course.  ECG/medicine tests: ordered and independent interpretation performed. Decision-making details documented in ED Course.    Risk  Prescription drug management.  Risk Details: Differential diagnosis includes but is not limited to: ACS, heart failure, pulmonary embolism, pneumonia, pleurisy, bronchitis                ED Medication(s):  Medications   iohexoL (OMNIPAQUE 350) injection 100 mL (100 mLs Intravenous Given 4/3/25 0143)       Discharge Medication List as of 4/3/2025  2:47 AM           Follow-up  Information       Schedule an appointment as soon as possible for a visit  with Shahzad Forte MD.    Specialty: Family Medicine  Contact information:  [place] [address]  [phone]                                 Scribe Attestation:   Scribe #1: I performed the above scribed service and the documentation accurately describes the services I performed. I attest to the accuracy of the note.     Attending:   Physician Attestation Statement for Scribe #1: I, Niles Tripp DO, personally performed the services described in this documentation, as scribed by Liang Gomes, in my presence, and it is both accurate and complete.           Clinical Impression       ICD-10-CM ICD-9-CM   1. Shortness of breath  R06.02 786.05       Disposition:   Disposition: Discharged  Condition: Stable       Niles Tripp DO  04/09/25 0132

## 2025-04-03 NOTE — FIRST PROVIDER EVALUATION
"Medical screening examination initiated.  I have conducted a focused provider triage encounter, findings are as follows:    Brief history of present illness:  35 yo female presenting to ED with complaints of chest pain and shortness of breath over the last few days.  States symptoms are worse whenever she is up and moving around.  Nothing makes the symptoms better.  Chest pain feels like a sharp sensation; located in the center of her chest and radiates to the right chest and under the left ribs.  Patient is a nursing student.  States that her young child brought home flu this week.  Denies fever and chills.    Vitals:    04/02/25 1905 04/02/25 1907   BP: (!) 152/90    BP Location: Right arm    Pulse: 80    Resp: 18    Temp: 98.4 °F (36.9 °C)    TempSrc: Oral    SpO2: 100%    Weight:  59.3 kg (130 lb 12.8 oz)   Height:  5' 2" (1.575 m)       Pertinent physical exam:  VSS. NAD. Respirations even and unlabored No adventitious lung sounds.     Brief workup plan:  Preliminary workup initiated; this workup will be continued and followed by the physician or advanced practice provider that is assigned to the patient when roomed.  "

## 2025-04-04 ENCOUNTER — OFFICE VISIT (OUTPATIENT)
Dept: PULMONOLOGY | Facility: CLINIC | Age: 35
End: 2025-04-04
Payer: COMMERCIAL

## 2025-04-04 VITALS
WEIGHT: 129.19 LBS | HEART RATE: 80 BPM | BODY MASS INDEX: 23.77 KG/M2 | RESPIRATION RATE: 18 BRPM | SYSTOLIC BLOOD PRESSURE: 110 MMHG | HEIGHT: 62 IN | DIASTOLIC BLOOD PRESSURE: 78 MMHG | OXYGEN SATURATION: 99 %

## 2025-04-04 DIAGNOSIS — J45.909 CHILDHOOD ASTHMA, UNSPECIFIED ASTHMA SEVERITY, UNSPECIFIED WHETHER COMPLICATED, UNSPECIFIED WHETHER PERSISTENT: ICD-10-CM

## 2025-04-04 DIAGNOSIS — R06.02 SHORTNESS OF BREATH: ICD-10-CM

## 2025-04-04 DIAGNOSIS — R09.82 POSTNASAL DRIP: ICD-10-CM

## 2025-04-04 DIAGNOSIS — R07.9 CHEST PAIN, UNSPECIFIED TYPE: Primary | ICD-10-CM

## 2025-04-04 PROCEDURE — 99999 PR PBB SHADOW E&M-EST. PATIENT-LVL IV: CPT | Mod: PBBFAC,,, | Performed by: INTERNAL MEDICINE

## 2025-04-04 RX ORDER — ALBUTEROL SULFATE 90 UG/1
2 INHALANT RESPIRATORY (INHALATION) EVERY 6 HOURS PRN
Qty: 18 G | Refills: 2 | Status: SHIPPED | OUTPATIENT
Start: 2025-04-04 | End: 2026-04-04

## 2025-04-04 RX ORDER — FLUTICASONE PROPIONATE 50 MCG
1 SPRAY, SUSPENSION (ML) NASAL DAILY
Qty: 16 G | Refills: 0 | Status: SHIPPED | OUTPATIENT
Start: 2025-04-04

## 2025-04-04 NOTE — PROGRESS NOTES
"                                            Initial Outpatient Pulmonary Evaluation       SUBJECTIVE:     Chief Complaint   Patient presents with    Shortness of Breath       History of Present Illness:    Patient is a 34 y.o. female presenting for evaluation of chest discomfort/pain/called air feeling with inhalation.      CT angio of the chest showed no pulmonary embolism or parenchymal abnormalities.      History of childhood asthma.      Has children with Allergy/asthma but  also has allergies.      Does have postnasal drip.    She is a nurse student.    Review of Systems   HENT:  Positive for postnasal drip.    Respiratory:  Positive for chest tightness and shortness of breath.        Review of patient's allergies indicates:  No Known Allergies    Current Medications[1]    Past Medical History:   Diagnosis Date    Cancer screening 2024    MyRisk MyRiad- MUTYH    Ovarian cyst     Left ovary     Past Surgical History:   Procedure Laterality Date    CARPAL TUNNEL RELEASE      CERVICAL DISC SURGERY      CHOLECYSTECTOMY      CYST REMOVAL      ENDOMETRIAL ABLATION  10/2023    EXCISIONAL HEMORRHOIDECTOMY      HERNIA REPAIR      WRIST SURGERY       Family History   Problem Relation Name Age of Onset    Diabetes Paternal Grandmother      Hypertension Paternal Grandmother      Ovarian cancer Maternal Grandmother      Cancer Maternal Grandmother          skin    Hypertension Maternal Grandmother      Stroke Maternal Grandmother      Colon cancer Maternal Grandfather      Breast cancer Neg Hx      Eclampsia Neg Hx      Miscarriages / Stillbirths Neg Hx       labor Neg Hx       Social History[2]       OBJECTIVE:     Vital Signs (Most Recent)  Vital Signs  Pulse: 80  Resp: 18  SpO2: 99 %  BP: 110/78  Patient Position: Sitting  Pain Score:   4  Pain Loc: Chest  Height and Weight  Height: 5' 2" (157.5 cm)  Weight: 58.6 kg (129 lb 3 oz)  BSA (Calculated - sq m): 1.6 sq meters  BMI (Calculated): 23.6  Weight " "in (lb) to have BMI = 25: 136.4]  Wt Readings from Last 2 Encounters:   04/04/25 58.6 kg (129 lb 3 oz)   04/02/25 59.3 kg (130 lb 12.8 oz)         Physical Exam:  Physical Exam   Constitutional: She is oriented to person, place, and time. She appears well-developed and well-nourished.   HENT:   Mouth/Throat: Mallampati Score: II.   Swollen left-sided nasal turbinate   Pulmonary/Chest: Normal expansion and effort normal. No respiratory distress. She has no decreased breath sounds. She has no wheezes. She has no rhonchi.   Neurological: She is alert and oriented to person, place, and time.   Psychiatric: She has a normal mood and affect. Her behavior is normal. Judgment and thought content normal.       Laboratory  Lab Results   Component Value Date    WBC 7.53 04/02/2025    RBC 4.27 04/02/2025    HGB 12.9 04/02/2025    HCT 38.5 04/02/2025    MCV 90 04/02/2025    MCH 30.2 04/02/2025    MCHC 33.5 04/02/2025    RDW 11.8 04/02/2025     04/02/2025    MPV 8.7 (L) 04/02/2025    GRAN 3.3 02/08/2023    GRAN 48.7 02/08/2023    LYMPH 59.9 (H) 04/02/2025    LYMPH 4.51 04/02/2025    MONO 6.9 04/02/2025    MONO 0.52 04/02/2025    EOS 1.7 04/02/2025    EOS 0.13 04/02/2025    BASO 0.03 02/08/2023    EOSINOPHIL 0.6 02/08/2023    BASOPHIL 0.3 04/02/2025    BASOPHIL 0.02 04/02/2025       BMP  Lab Results   Component Value Date     04/02/2025    K 4.0 04/02/2025     04/02/2025    CO2 25 04/02/2025    BUN 13 04/02/2025    CREATININE 0.8 04/02/2025    CALCIUM 9.2 04/02/2025    ANIONGAP 6 (L) 04/02/2025    ESTGFRAFRICA >60 05/12/2021    EGFRNONAA >60 05/12/2021    AST 17 04/02/2025    ALT 23 04/02/2025    PROT 7.5 02/08/2023       Lab Results   Component Value Date    BNP <10 04/02/2025    BNP 26 03/22/2021       Lab Results   Component Value Date    TSH 1.380 08/31/2022       No results found for: "SEDRATE"    No results found for: "CRP"    No results found for: "IGE"    No results found for: "ASPERGILLUS"  No results " "found for: "AFUMIGATUSCL"     No results found for: "ACE"    Diagnostic Results:  I have personally reviewed today the following studies :    CT angio of the chest 2025 unremarkable         ASSESSMENT/PLAN:     Chest pain, unspecified type  -     Spirometry with/without bronchodilator; Future  -     Fraction of  Nitric Oxide; Future; Expected date: 2025    Shortness of breath  -     Ambulatory referral/consult to Pulmonology  -     Spirometry with/without bronchodilator; Future  -     Fraction of  Nitric Oxide; Future; Expected date: 2025  -     albuterol (PROVENTIL/VENTOLIN HFA) 90 mcg/actuation inhaler; Inhale 2 puffs into the lungs every 6 (six) hours as needed for Wheezing. Rescue  Dispense: 18 g; Refill: 2    Childhood asthma, unspecified asthma severity, unspecified whether complicated, unspecified whether persistent  -     Spirometry with/without bronchodilator; Future  -     Fraction of  Nitric Oxide; Future; Expected date: 2025    Postnasal drip  -     fluticasone propionate (FLONASE) 50 mcg/actuation nasal spray; 1 spray (50 mcg total) by Each Nostril route once daily.  Dispense: 16 g; Refill: 0        Trial of albuterol and Flonase     Spirometry and fraction  nitric oxide.      Additional recommendation to follow above workup.  Follow up in about 6 weeks (around 2025).    This note was prepared using voice recognition system and is likely to have sound alike errors that may have been overlooked even after proof reading.  Please call me with any questions    Discussed diagnosis, its evaluation, treatment and usual course. All questions answered.    Thank you for the courtesy of participating in the care of this patient    Rina Shay MD               [1]   Current Outpatient Medications   Medication Sig Dispense Refill    ADDERALL 10 mg Tab       MYDAYIS 37.5 mg CT24       albuterol (PROVENTIL/VENTOLIN HFA) 90 mcg/actuation inhaler Inhale 2 " puffs into the lungs every 6 (six) hours as needed for Wheezing. Rescue 18 g 2    ergocalciferol (ERGOCALCIFEROL) 50,000 unit Cap Take 1 capsule (50,000 Units total) by mouth every 7 days. (Patient not taking: Reported on 11/26/2024) 12 capsule 0    fluticasone propionate (FLONASE) 50 mcg/actuation nasal spray 1 spray (50 mcg total) by Each Nostril route once daily. 16 g 0    methocarbamoL (ROBAXIN) 500 MG Tab Take 1 tablet (500 mg total) by mouth 3 (three) times daily as needed. (Patient not taking: Reported on 11/26/2024) 30 tablet 0    prenatal 105-iron-folic ac-dha 30 mg iron- 1.4 mg-300 mg Cmpk Take by mouth. (Patient not taking: Reported on 11/26/2024)      venlafaxine (EFFEXOR-XR) 75 MG 24 hr capsule Take 75 mg by mouth. (Patient not taking: Reported on 11/26/2024)       No current facility-administered medications for this visit.   [2]   Social History  Tobacco Use    Smoking status: Never    Smokeless tobacco: Never   Substance Use Topics    Alcohol use: Yes     Comment: socially     Drug use: No

## 2025-07-17 ENCOUNTER — HOSPITAL ENCOUNTER (OUTPATIENT)
Facility: HOSPITAL | Age: 35
Discharge: HOME | End: 2025-07-19
Attending: EMERGENCY MEDICINE | Admitting: STUDENT IN AN ORGANIZED HEALTH CARE EDUCATION/TRAINING PROGRAM
Payer: COMMERCIAL

## 2025-07-17 DIAGNOSIS — R19.5 LOOSE STOOLS: ICD-10-CM

## 2025-07-17 DIAGNOSIS — R10.31 RLQ ABDOMINAL PAIN: ICD-10-CM

## 2025-07-17 DIAGNOSIS — R11.0 NAUSEA: ICD-10-CM

## 2025-07-17 DIAGNOSIS — R74.01 TRANSAMINITIS: Primary | ICD-10-CM

## 2025-07-17 PROBLEM — F32.A ANXIETY AND DEPRESSION: Status: ACTIVE | Noted: 2025-07-17

## 2025-07-17 PROBLEM — N30.00 ACUTE CYSTITIS: Status: ACTIVE | Noted: 2025-07-17

## 2025-07-17 PROBLEM — R51.9 HEADACHE: Status: ACTIVE | Noted: 2025-07-17

## 2025-07-17 PROBLEM — F41.9 ANXIETY: Status: ACTIVE | Noted: 2025-07-17

## 2025-07-17 LAB
ABORH RETYPE: NORMAL
ABSOLUTE EOSINOPHIL (OHS): 0.01 K/UL
ABSOLUTE MONOCYTE (OHS): 0.47 K/UL (ref 0.3–1)
ABSOLUTE NEUTROPHIL COUNT (OHS): 5.51 K/UL (ref 1.8–7.7)
ALBUMIN SERPL BCP-MCNC: 4.3 G/DL (ref 3.5–5.2)
ALP SERPL-CCNC: 51 UNIT/L (ref 40–150)
ALT SERPL W/O P-5'-P-CCNC: 11 UNIT/L (ref 10–44)
ANION GAP (OHS): 8 MMOL/L (ref 8–16)
AST SERPL-CCNC: 13 UNIT/L (ref 11–45)
B-HCG UR QL: NEGATIVE
BACTERIA #/AREA URNS AUTO: ABNORMAL /HPF
BASOPHILS # BLD AUTO: 0.03 K/UL
BASOPHILS NFR BLD AUTO: 0.3 %
BILIRUB SERPL-MCNC: 0.8 MG/DL (ref 0.1–1)
BILIRUB UR QL STRIP.AUTO: NEGATIVE
BUN SERPL-MCNC: 15 MG/DL (ref 6–20)
CALCIUM SERPL-MCNC: 9.5 MG/DL (ref 8.7–10.5)
CHLORIDE SERPL-SCNC: 105 MMOL/L (ref 95–110)
CLARITY UR: ABNORMAL
CO2 SERPL-SCNC: 24 MMOL/L (ref 23–29)
COLOR UR AUTO: YELLOW
CREAT SERPL-MCNC: 0.8 MG/DL (ref 0.5–1.4)
ERYTHROCYTE [DISTWIDTH] IN BLOOD BY AUTOMATED COUNT: 11.3 % (ref 11.5–14.5)
GFR SERPLBLD CREATININE-BSD FMLA CKD-EPI: >60 ML/MIN/1.73/M2
GLUCOSE SERPL-MCNC: 95 MG/DL (ref 70–110)
GLUCOSE UR QL STRIP: NEGATIVE
HCT VFR BLD AUTO: 34.3 % (ref 37–48.5)
HCT VFR BLD AUTO: 34.8 % (ref 37–48.5)
HCT VFR BLD AUTO: 37.8 % (ref 37–48.5)
HGB BLD-MCNC: 11.5 GM/DL (ref 12–16)
HGB BLD-MCNC: 11.8 GM/DL (ref 12–16)
HGB BLD-MCNC: 13 GM/DL (ref 12–16)
HGB UR QL STRIP: ABNORMAL
HYALINE CASTS UR QL AUTO: 0 /LPF (ref 0–1)
IMM GRANULOCYTES # BLD AUTO: 0.04 K/UL (ref 0–0.04)
IMM GRANULOCYTES NFR BLD AUTO: 0.5 % (ref 0–0.5)
INDIRECT COOMBS: NORMAL
KETONES UR QL STRIP: ABNORMAL
LEUKOCYTE ESTERASE UR QL STRIP: ABNORMAL
LIPASE SERPL-CCNC: 13 U/L (ref 4–60)
LYMPHOCYTES # BLD AUTO: 2.57 K/UL (ref 1–4.8)
MCH RBC QN AUTO: 30.9 PG (ref 27–31)
MCHC RBC AUTO-ENTMCNC: 34.4 G/DL (ref 32–36)
MCV RBC AUTO: 90 FL (ref 82–98)
MICROSCOPIC COMMENT: ABNORMAL
NITRITE UR QL STRIP: NEGATIVE
NUCLEATED RBC (/100WBC) (OHS): 0 /100 WBC
OHS QRS DURATION: 96 MS
OHS QTC CALCULATION: 454 MS
PH UR STRIP: 6 [PH]
PLATELET # BLD AUTO: 262 K/UL (ref 150–450)
PMV BLD AUTO: 9.2 FL (ref 9.2–12.9)
POTASSIUM SERPL-SCNC: 4 MMOL/L (ref 3.5–5.1)
PROT SERPL-MCNC: 7.5 GM/DL (ref 6–8.4)
PROT UR QL STRIP: ABNORMAL
RBC # BLD AUTO: 4.21 M/UL (ref 4–5.4)
RBC #/AREA URNS AUTO: 2 /HPF (ref 0–4)
RELATIVE EOSINOPHIL (OHS): 0.1 %
RELATIVE LYMPHOCYTE (OHS): 29.8 % (ref 18–48)
RELATIVE MONOCYTE (OHS): 5.4 % (ref 4–15)
RELATIVE NEUTROPHIL (OHS): 63.9 % (ref 38–73)
RH BLD: NORMAL
SODIUM SERPL-SCNC: 137 MMOL/L (ref 136–145)
SP GR UR STRIP: >=1.03
SPECIMEN OUTDATE: NORMAL
SQUAMOUS #/AREA URNS AUTO: 80 /HPF
UROBILINOGEN UR STRIP-ACNC: ABNORMAL EU/DL
WBC # BLD AUTO: 8.63 K/UL (ref 3.9–12.7)
WBC #/AREA URNS AUTO: 30 /HPF (ref 0–5)

## 2025-07-17 PROCEDURE — 93010 ELECTROCARDIOGRAM REPORT: CPT | Mod: ,,, | Performed by: INTERNAL MEDICINE

## 2025-07-17 PROCEDURE — 96365 THER/PROPH/DIAG IV INF INIT: CPT

## 2025-07-17 PROCEDURE — 80053 COMPREHEN METABOLIC PANEL: CPT | Performed by: EMERGENCY MEDICINE

## 2025-07-17 PROCEDURE — 85014 HEMATOCRIT: CPT | Performed by: EMERGENCY MEDICINE

## 2025-07-17 PROCEDURE — 87086 URINE CULTURE/COLONY COUNT: CPT | Performed by: EMERGENCY MEDICINE

## 2025-07-17 PROCEDURE — 25000003 PHARM REV CODE 250: Performed by: EMERGENCY MEDICINE

## 2025-07-17 PROCEDURE — G0378 HOSPITAL OBSERVATION PER HR: HCPCS

## 2025-07-17 PROCEDURE — 81025 URINE PREGNANCY TEST: CPT | Performed by: EMERGENCY MEDICINE

## 2025-07-17 PROCEDURE — 96375 TX/PRO/DX INJ NEW DRUG ADDON: CPT

## 2025-07-17 PROCEDURE — 96361 HYDRATE IV INFUSION ADD-ON: CPT

## 2025-07-17 PROCEDURE — 63600175 PHARM REV CODE 636 W HCPCS: Performed by: EMERGENCY MEDICINE

## 2025-07-17 PROCEDURE — 93005 ELECTROCARDIOGRAM TRACING: CPT

## 2025-07-17 PROCEDURE — 83690 ASSAY OF LIPASE: CPT | Performed by: EMERGENCY MEDICINE

## 2025-07-17 PROCEDURE — 99204 OFFICE O/P NEW MOD 45 MIN: CPT | Mod: ,,, | Performed by: SURGERY

## 2025-07-17 PROCEDURE — 85025 COMPLETE CBC W/AUTO DIFF WBC: CPT | Performed by: EMERGENCY MEDICINE

## 2025-07-17 PROCEDURE — 81003 URINALYSIS AUTO W/O SCOPE: CPT | Performed by: EMERGENCY MEDICINE

## 2025-07-17 PROCEDURE — 25000003 PHARM REV CODE 250: Performed by: NURSE PRACTITIONER

## 2025-07-17 PROCEDURE — 25500020 PHARM REV CODE 255: Performed by: EMERGENCY MEDICINE

## 2025-07-17 PROCEDURE — 96376 TX/PRO/DX INJ SAME DRUG ADON: CPT

## 2025-07-17 PROCEDURE — 86850 RBC ANTIBODY SCREEN: CPT | Performed by: EMERGENCY MEDICINE

## 2025-07-17 PROCEDURE — 85018 HEMOGLOBIN: CPT | Performed by: EMERGENCY MEDICINE

## 2025-07-17 PROCEDURE — 96367 TX/PROPH/DG ADDL SEQ IV INF: CPT

## 2025-07-17 PROCEDURE — 99285 EMERGENCY DEPT VISIT HI MDM: CPT | Mod: 25

## 2025-07-17 PROCEDURE — 63600175 PHARM REV CODE 636 W HCPCS: Performed by: NURSE PRACTITIONER

## 2025-07-17 RX ORDER — NALOXONE HCL 0.4 MG/ML
0.02 VIAL (ML) INJECTION
Status: DISCONTINUED | OUTPATIENT
Start: 2025-07-17 | End: 2025-07-19 | Stop reason: HOSPADM

## 2025-07-17 RX ORDER — HYDROXYZINE PAMOATE 25 MG/1
50 CAPSULE ORAL EVERY 8 HOURS PRN
Status: DISCONTINUED | OUTPATIENT
Start: 2025-07-17 | End: 2025-07-19 | Stop reason: HOSPADM

## 2025-07-17 RX ORDER — SODIUM CHLORIDE, SODIUM LACTATE, POTASSIUM CHLORIDE, CALCIUM CHLORIDE 600; 310; 30; 20 MG/100ML; MG/100ML; MG/100ML; MG/100ML
1000 INJECTION, SOLUTION INTRAVENOUS
Status: COMPLETED | OUTPATIENT
Start: 2025-07-17 | End: 2025-07-17

## 2025-07-17 RX ORDER — IBUPROFEN 200 MG
24 TABLET ORAL
Status: DISCONTINUED | OUTPATIENT
Start: 2025-07-17 | End: 2025-07-19 | Stop reason: HOSPADM

## 2025-07-17 RX ORDER — HYDROCODONE BITARTRATE AND ACETAMINOPHEN 7.5; 325 MG/1; MG/1
1 TABLET ORAL EVERY 6 HOURS PRN
Refills: 0 | Status: DISCONTINUED | OUTPATIENT
Start: 2025-07-17 | End: 2025-07-19 | Stop reason: HOSPADM

## 2025-07-17 RX ORDER — SODIUM CHLORIDE 0.9 % (FLUSH) 0.9 %
3 SYRINGE (ML) INJECTION EVERY 8 HOURS PRN
Status: DISCONTINUED | OUTPATIENT
Start: 2025-07-17 | End: 2025-07-19 | Stop reason: HOSPADM

## 2025-07-17 RX ORDER — MORPHINE SULFATE 4 MG/ML
4 INJECTION, SOLUTION INTRAMUSCULAR; INTRAVENOUS
Refills: 0 | Status: COMPLETED | OUTPATIENT
Start: 2025-07-17 | End: 2025-07-17

## 2025-07-17 RX ORDER — HYDROMORPHONE HYDROCHLORIDE 1 MG/ML
0.5 INJECTION, SOLUTION INTRAMUSCULAR; INTRAVENOUS; SUBCUTANEOUS EVERY 6 HOURS PRN
Status: DISCONTINUED | OUTPATIENT
Start: 2025-07-17 | End: 2025-07-19

## 2025-07-17 RX ORDER — HYDROMORPHONE HYDROCHLORIDE 1 MG/ML
1 INJECTION, SOLUTION INTRAMUSCULAR; INTRAVENOUS; SUBCUTANEOUS
Refills: 0 | Status: COMPLETED | OUTPATIENT
Start: 2025-07-17 | End: 2025-07-17

## 2025-07-17 RX ORDER — FLUTICASONE PROPIONATE 50 MCG
1 SPRAY, SUSPENSION (ML) NASAL DAILY
Status: DISCONTINUED | OUTPATIENT
Start: 2025-07-18 | End: 2025-07-19 | Stop reason: HOSPADM

## 2025-07-17 RX ORDER — KETOROLAC TROMETHAMINE 30 MG/ML
15 INJECTION, SOLUTION INTRAMUSCULAR; INTRAVENOUS
Status: COMPLETED | OUTPATIENT
Start: 2025-07-17 | End: 2025-07-17

## 2025-07-17 RX ORDER — BUTALBITAL, ACETAMINOPHEN AND CAFFEINE 50; 325; 40 MG/1; MG/1; MG/1
1 TABLET ORAL ONCE
Status: COMPLETED | OUTPATIENT
Start: 2025-07-17 | End: 2025-07-17

## 2025-07-17 RX ORDER — PROMETHAZINE HYDROCHLORIDE 25 MG/1
25 TABLET ORAL EVERY 6 HOURS PRN
Status: DISCONTINUED | OUTPATIENT
Start: 2025-07-17 | End: 2025-07-19 | Stop reason: HOSPADM

## 2025-07-17 RX ORDER — IBUPROFEN 200 MG
16 TABLET ORAL
Status: DISCONTINUED | OUTPATIENT
Start: 2025-07-17 | End: 2025-07-19 | Stop reason: HOSPADM

## 2025-07-17 RX ORDER — ONDANSETRON HYDROCHLORIDE 2 MG/ML
4 INJECTION, SOLUTION INTRAVENOUS
Status: COMPLETED | OUTPATIENT
Start: 2025-07-17 | End: 2025-07-17

## 2025-07-17 RX ORDER — GLUCAGON 1 MG
1 KIT INJECTION
Status: DISCONTINUED | OUTPATIENT
Start: 2025-07-17 | End: 2025-07-19 | Stop reason: HOSPADM

## 2025-07-17 RX ORDER — ONDANSETRON HYDROCHLORIDE 2 MG/ML
4 INJECTION, SOLUTION INTRAVENOUS EVERY 6 HOURS PRN
Status: DISCONTINUED | OUTPATIENT
Start: 2025-07-17 | End: 2025-07-19 | Stop reason: HOSPADM

## 2025-07-17 RX ORDER — SODIUM CHLORIDE, SODIUM LACTATE, POTASSIUM CHLORIDE, CALCIUM CHLORIDE 600; 310; 30; 20 MG/100ML; MG/100ML; MG/100ML; MG/100ML
INJECTION, SOLUTION INTRAVENOUS CONTINUOUS
Status: DISCONTINUED | OUTPATIENT
Start: 2025-07-17 | End: 2025-07-19

## 2025-07-17 RX ORDER — FAMOTIDINE 20 MG/50ML
20 INJECTION, SOLUTION INTRAVENOUS 2 TIMES DAILY
Status: DISCONTINUED | OUTPATIENT
Start: 2025-07-17 | End: 2025-07-19 | Stop reason: HOSPADM

## 2025-07-17 RX ADMIN — ONDANSETRON 4 MG: 2 INJECTION INTRAMUSCULAR; INTRAVENOUS at 09:07

## 2025-07-17 RX ADMIN — SODIUM CHLORIDE, POTASSIUM CHLORIDE, SODIUM LACTATE AND CALCIUM CHLORIDE 1000 ML: 600; 310; 30; 20 INJECTION, SOLUTION INTRAVENOUS at 06:07

## 2025-07-17 RX ADMIN — IOHEXOL 100 ML: 350 INJECTION, SOLUTION INTRAVENOUS at 01:07

## 2025-07-17 RX ADMIN — BUTALBITAL, ACETAMINOPHEN, AND CAFFEINE 1 TABLET: 325; 50; 40 TABLET ORAL at 09:07

## 2025-07-17 RX ADMIN — KETOROLAC TROMETHAMINE 15 MG: 30 INJECTION, SOLUTION INTRAMUSCULAR at 12:07

## 2025-07-17 RX ADMIN — SODIUM CHLORIDE, POTASSIUM CHLORIDE, SODIUM LACTATE AND CALCIUM CHLORIDE: 600; 310; 30; 20 INJECTION, SOLUTION INTRAVENOUS at 09:07

## 2025-07-17 RX ADMIN — HYDROMORPHONE HYDROCHLORIDE 1 MG: 1 INJECTION, SOLUTION INTRAMUSCULAR; INTRAVENOUS; SUBCUTANEOUS at 05:07

## 2025-07-17 RX ADMIN — SODIUM CHLORIDE 1000 ML: 9 INJECTION, SOLUTION INTRAVENOUS at 11:07

## 2025-07-17 RX ADMIN — HYDROMORPHONE HYDROCHLORIDE 1 MG: 1 INJECTION, SOLUTION INTRAMUSCULAR; INTRAVENOUS; SUBCUTANEOUS at 01:07

## 2025-07-17 RX ADMIN — MORPHINE SULFATE 4 MG: 4 INJECTION INTRAVENOUS at 11:07

## 2025-07-17 RX ADMIN — FAMOTIDINE 20 MG: 20 INJECTION, SOLUTION INTRAVENOUS at 09:07

## 2025-07-17 RX ADMIN — PIPERACILLIN SODIUM AND TAZOBACTAM SODIUM 4.5 G: 4; .5 INJECTION, POWDER, LYOPHILIZED, FOR SOLUTION INTRAVENOUS at 01:07

## 2025-07-17 RX ADMIN — ONDANSETRON 4 MG: 2 INJECTION INTRAMUSCULAR; INTRAVENOUS at 11:07

## 2025-07-17 NOTE — HPI
Anusha Cardozo is a 34 y.o. female w/ hx anxiety, ADHD, and ovarian cysts who presented to the ER with a 2 day history of nausea, vomiting, diarrhea, and abdominal pain.  Pt states the pain started near her umbilicus about 2 days ago and is now located in her right flank/ RLQ.  She describes the pain as a pressure in that area, not cramping in nature.  Exacerbated by movement.  She has a history of abdominal pain and has previously had a cholecystectomy (but has persistent RUQ pain), and previous umbilical hernia repair with mesh.  She states she's been unable to keep down food or drink for 2 days.  She has a history of constipation and  had a colonoscopy last year (?) which she states was normal.  No family history of inflammatory bowel disease. On arrival to the ER she was afebrile, vitals WNL.  Labs were unremarkable- normal WBC, BUN, and Cr. Urinalysis was significant for WBC, bacteria, and leuk esterases.  CT abdomen/ pelvis showed distended cecum with fluid concerning for diarrhea illness without identification of the appendix.  There was original concern for small to moderate volume suspected hemorrhagic fluid in the pelvis.  Subsequent transvaginal ultrasound showed trace free fluid in the pelvis and on review of the previous imaging it was believed that the area initially thought to be hemoperitoneum was actually a combination of diarrheal illness (fluid in the cecum deep in the pelvis) and the displaced bladder.

## 2025-07-17 NOTE — ED PROVIDER NOTES
SCRIBE #1 NOTE: I, Argelia Solis, am scribing for, and in the presence of, Tara Beaulieu DO. I have scribed the entire note.       History     Chief Complaint   Patient presents with    Abdominal Pain     Pt states she is having pain in the RLQ of her abd for the past 2 days. +N/V/D     Review of patient's allergies indicates:  No Known Allergies      History of Present Illness     HPI    7/17/2025, 11:34 AM  History obtained from the patient      History of Present Illness: Anusha Cardozo is a 34 y.o. female patient with a PMHx of ovarian cysts who presents to the Emergency Department for evaluation of RLQ abdominal pain which began 2 days ago. Patient states she has not been able to eat solid food in two days. She also says her stool had mucous in it, then became diarrhea after. She rates her pain a 7.5/10 with movement. Symptoms are constant. Pain is exacerbated with twisting or bending down. Associated sxs include n/v, loss of appetite, and increase in urinary urgency. Patient denies any back pain, blood in stool, fever, vaginal bleeding, vaginal discharge or recent antibiotics, sick contacts, or travel. No prior Tx included. Patient finished a smoothie at 9:30 AM, and took her medications at 6:30 this morning. She did have her gallbladder removed in 2022. No further complaints or concerns at this time.       Arrival mode: Personal Transportation    PCP: Shahzad Forte MD        Past Medical History:  Past Medical History:   Diagnosis Date    ADHD     Anxiety disorder, unspecified     Asthma     Cancer screening 11/26/2024    MyRisk MyRi[facility]- MUTYH    Headache 07/17/2025    Ovarian cyst     Left ovary       Past Surgical History:  Past Surgical History:   Procedure Laterality Date    CARPAL TUNNEL RELEASE      CERVICAL DISC SURGERY      CHOLECYSTECTOMY      CYST REMOVAL      ENDOMETRIAL ABLATION  10/2023    EXCISIONAL HEMORRHOIDECTOMY      x 2    HERNIA REPAIR  2022    umbilical with mesh    WRIST  SURGERY           Family History:  Family History   Problem Relation Name Age of Onset    No Known Problems Mother      No Known Problems Father      Ovarian cancer Maternal Grandmother      Cancer Maternal Grandmother          skin    Hypertension Maternal Grandmother      Stroke Maternal Grandmother      Colon cancer Maternal Grandfather      Diabetes Paternal Grandmother      Hypertension Paternal Grandmother      Breast cancer Neg Hx      Eclampsia Neg Hx      Miscarriages / Stillbirths Neg Hx       labor Neg Hx         Social History:  Social History     Tobacco Use    Smoking status: Never    Smokeless tobacco: Never   Substance and Sexual Activity    Alcohol use: Yes     Comment: socially , rarely    Drug use: No    Sexual activity: Yes     Partners: Male     Birth control/protection: Partner-Vasectomy        Review of Systems     Review of Systems   Constitutional:  Positive for appetite change (loss). Negative for fever.   Gastrointestinal:  Positive for abdominal pain (RLQ), diarrhea, nausea and vomiting. Negative for blood in stool.   Genitourinary:  Positive for urgency (increase). Negative for vaginal bleeding and vaginal discharge.   Musculoskeletal:  Negative for back pain.      Physical Exam     Initial Vitals [25 1105]   BP Pulse Resp Temp SpO2   135/71 87 16 98.4 °F (36.9 °C) 97 %      MAP       --          Physical Exam  Nursing Notes and Vital Signs Reviewed.  Constitutional: Patient is in no acute distress. Well-developed and well-nourished.  Head: Atraumatic. Normocephalic.  Eyes: PERRL. EOM intact.  No scleral icterus.  ENT: Mucous membranes are moist.   Cardiovascular: Regular rate. Regular rhythm. No murmurs, rubs, or gallops. Pulmonary/Chest: No respiratory distress. Clear to auscultation bilaterally. No wheezing or rales.  Abdominal: Soft and non-distended.  Generalized abdominal pain, most marked in RLQ. Good bowel sounds.  Genitourinary: No CVA tenderness.  Musculoskeletal:  Moves all extremities. No obvious deformities. No edema. No calf tenderness.  Skin: Warm and dry.  No jaundice.  Neurological:  Alert, awake, and appropriate.  Normal speech.  No acute focal neurological deficits are appreciated.  Psychiatric: Normal affect. Good eye contact. Appropriate in content.     ED Course   Procedures  ED Vital Signs:  Vitals:    07/18/25 2319 07/19/25 0100 07/19/25 0300 07/19/25 0314   BP: 110/62      Pulse: 62 (!) 59 75    Resp: 18   16   Temp: 98.3 °F (36.8 °C)      TempSrc: Oral      SpO2: 95%      Weight:       Height:        07/19/25 0411 07/19/25 0446 07/19/25 0500 07/19/25 0706   BP:  (!) 110/53     Pulse: 70 76 73 64   Resp:  18     Temp:  99 °F (37.2 °C)     TempSrc:  Oral     SpO2:  97%     Weight:       Height:        07/19/25 0727 07/19/25 0801 07/19/25 0925 07/19/25 1114   BP:  (!) 115/57     Pulse: 69 65 70 70   Resp:  19     Temp:  98 °F (36.7 °C)     TempSrc:  Oral     SpO2:  97%     Weight:       Height:        07/19/25 1212 07/19/25 1345 07/19/25 1448   BP: (!) 100/54     Pulse: 70 68 85   Resp: 19     Temp: 99.1 °F (37.3 °C)     TempSrc: Oral     SpO2: 98%     Weight:      Height:          Abnormal Lab Results:  Labs Reviewed   URINALYSIS, REFLEX TO URINE CULTURE - Abnormal       Result Value    Color, UA Yellow      Appearance, UA Cloudy (*)     pH, UA 6.0      Spec Grav UA >=1.030 (*)     Protein, UA 1+ (*)     Glucose, UA Negative      Ketones, UA 1+ (*)     Bilirubin, UA Negative      Blood, UA Trace (*)     Nitrites, UA Negative      Urobilinogen, UA 2.0-3.0 (*)     Leukocyte Esterase, UA 1+ (*)    CBC WITH DIFFERENTIAL - Abnormal    WBC 8.63      RBC 4.21      HGB 13.0      HCT 37.8      MCV 90      MCH 30.9      MCHC 34.4      RDW 11.3 (*)     Platelet Count 262      MPV 9.2      Nucleated RBC 0      Neut % 63.9      Lymph % 29.8      Mono % 5.4      Eos % 0.1      Basophil % 0.3      Imm Grans % 0.5      Neut # 5.51      Lymph # 2.57      Mono # 0.47      Eos #  0.01      Baso # 0.03      Imm Grans # 0.04     URINALYSIS MICROSCOPIC - Abnormal    RBC, UA 2      WBC, UA 30 (*)     Bacteria, UA Moderate (*)     Squamous Epithelial Cells, UA 80      Hyaline Casts, UA 0      Microscopic Comment       HEMOGLOBIN - Abnormal    HGB 11.8 (*)    HEMATOCRIT - Abnormal    HCT 34.8 (*)    HEMOGLOBIN - Abnormal    HGB 11.5 (*)    HEMATOCRIT - Abnormal    HCT 34.3 (*)    COMPREHENSIVE METABOLIC PANEL - Normal    Sodium 137      Potassium 4.0      Chloride 105      CO2 24      Glucose 95      BUN 15      Creatinine 0.8      Calcium 9.5      Protein Total 7.5      Albumin 4.3      Bilirubin Total 0.8      ALP 51      AST 13      ALT 11      Anion Gap 8      eGFR >60     PREGNANCY TEST, URINE RAPID - Normal    hCG Qualitative, Urine Negative     LIPASE - Normal    Lipase Level 13     CULTURE, STOOL   CBC W/ AUTO DIFFERENTIAL    Narrative:     The following orders were created for panel order CBC auto differential.  Procedure                               Abnormality         Status                     ---------                               -----------         ------                     CBC with Differential[[id number]]       Abnormal            Final result                 Please view results for these tests on the individual orders.   TYPE & SCREEN    Specimen Outdate 07/20/2025 23:59      Group & Rh O POS      Indirect Swetha NEG     ABORH RETYPE    ABORH Retype O POS          All Lab Results:  Results for orders placed or performed during the hospital encounter of 07/17/25   Comprehensive metabolic panel    Collection Time: 07/17/25 11:29 AM   Result Value Ref Range    Sodium 137 136 - 145 mmol/L    Potassium 4.0 3.5 - 5.1 mmol/L    Chloride 105 95 - 110 mmol/L    CO2 24 23 - 29 mmol/L    Glucose 95 70 - 110 mg/dL    BUN 15 6 - 20 mg/dL    Creatinine 0.8 0.5 - 1.4 mg/dL    Calcium 9.5 8.7 - 10.5 mg/dL    Protein Total 7.5 6.0 - 8.4 gm/dL    Albumin 4.3 3.5 - 5.2 g/dL    Bilirubin Total  0.8 0.1 - 1.0 mg/dL    ALP 51 40 - 150 unit/L    AST 13 11 - 45 unit/L    ALT 11 10 - 44 unit/L    Anion Gap 8 8 - 16 mmol/L    eGFR >60 >60 mL/min/1.73/m2   CBC with Differential    Collection Time: 07/17/25 11:29 AM   Result Value Ref Range    WBC 8.63 3.90 - 12.70 K/uL    RBC 4.21 4.00 - 5.40 M/uL    HGB 13.0 12.0 - 16.0 gm/dL    HCT 37.8 37.0 - 48.5 %    MCV 90 82 - 98 fL    MCH 30.9 27.0 - 31.0 pg    MCHC 34.4 32.0 - 36.0 g/dL    RDW 11.3 (L) 11.5 - 14.5 %    Platelet Count 262 150 - 450 K/uL    MPV 9.2 9.2 - 12.9 fL    Nucleated RBC 0 <=0 /100 WBC    Neut % 63.9 38 - 73 %    Lymph % 29.8 18 - 48 %    Mono % 5.4 4 - 15 %    Eos % 0.1 <=8 %    Basophil % 0.3 <=1.9 %    Imm Grans % 0.5 0.0 - 0.5 %    Neut # 5.51 1.8 - 7.7 K/uL    Lymph # 2.57 1 - 4.8 K/uL    Mono # 0.47 0.3 - 1 K/uL    Eos # 0.01 <=0.5 K/uL    Baso # 0.03 <=0.2 K/uL    Imm Grans # 0.04 0.00 - 0.04 K/uL   Lipase    Collection Time: 07/17/25 11:29 AM   Result Value Ref Range    Lipase Level 13 4 - 60 U/L   Urine culture    Collection Time: 07/17/25 11:43 AM    Specimen: Urine, Clean Catch   Result Value Ref Range    Urine Culture (A)      10,000 - 49,999 cfu/ml COAGULASE NEGATIVE STAPHYLOCOCCI   Pregnancy, urine rapid    Collection Time: 07/17/25 11:43 AM   Result Value Ref Range    hCG Qualitative, Urine Negative Negative   Urinalysis, Reflex to Urine Culture Urine, Clean Catch    Collection Time: 07/17/25 11:43 AM    Specimen: Urine, Clean Catch   Result Value Ref Range    Color, UA Yellow Straw, Mireille, Yellow, Light-Orange    Appearance, UA Cloudy (A) Clear    pH, UA 6.0 5.0 - 8.0    Spec Grav UA >=1.030 (A) 1.005 - 1.030    Protein, UA 1+ (A) Negative    Glucose, UA Negative Negative    Ketones, UA 1+ (A) Negative    Bilirubin, UA Negative Negative    Blood, UA Trace (A) Negative    Nitrites, UA Negative Negative    Urobilinogen, UA 2.0-3.0 (A) <2.0 EU/dL    Leukocyte Esterase, UA 1+ (A) Negative   GREY TOP URINE HOLD    Collection Time:  07/17/25 11:43 AM   Result Value Ref Range    Extra Tube Hold for add-ons.    Urinalysis Microscopic    Collection Time: 07/17/25 11:43 AM   Result Value Ref Range    RBC, UA 2 0 - 4 /HPF    WBC, UA 30 (H) 0 - 5 /HPF    Bacteria, UA Moderate (A) None, Rare, Occasional /HPF    Squamous Epithelial Cells, UA 80 <=5 /HPF    Hyaline Casts, UA 0 0 - 1 /LPF    Microscopic Comment     EKG 12-lead    Collection Time: 07/17/25  1:52 PM   Result Value Ref Range    QRS Duration 96 ms    [facility] QTC Calculation 454 ms   ABORH RETYPE    Collection Time: 07/17/25  1:58 PM   Result Value Ref Range    ABORH Retype O POS    Type & Screen    Collection Time: 07/17/25  2:00 PM   Result Value Ref Range    Specimen Outdate 07/20/2025 23:59     Group & Rh O POS     Indirect Swetha NEG    Hemoglobin    Collection Time: 07/17/25  2:40 PM   Result Value Ref Range    HGB 11.8 (L) 12.0 - 16.0 gm/dL   Hematocrit    Collection Time: 07/17/25  2:40 PM   Result Value Ref Range    HCT 34.8 (L) 37.0 - 48.5 %   Hemoglobin    Collection Time: 07/17/25  5:26 PM   Result Value Ref Range    HGB 11.5 (L) 12.0 - 16.0 gm/dL   Hematocrit    Collection Time: 07/17/25  5:26 PM   Result Value Ref Range    HCT 34.3 (L) 37.0 - 48.5 %   Comprehensive Metabolic Panel (CMP)    Collection Time: 07/18/25  3:57 AM   Result Value Ref Range    Sodium 138 136 - 145 mmol/L    Potassium 4.0 3.5 - 5.1 mmol/L    Chloride 107 95 - 110 mmol/L    CO2 25 23 - 29 mmol/L    Glucose 86 70 - 110 mg/dL    BUN 15 6 - 20 mg/dL    Creatinine 0.7 0.5 - 1.4 mg/dL    Calcium 8.4 (L) 8.7 - 10.5 mg/dL    Protein Total 5.8 (L) 6.0 - 8.4 gm/dL    Albumin 3.3 (L) 3.5 - 5.2 g/dL    Bilirubin Total 0.9 0.1 - 1.0 mg/dL    ALP 62 40 - 150 unit/L     (H) 11 - 45 unit/L     (H) 10 - 44 unit/L    Anion Gap 6 (L) 8 - 16 mmol/L    eGFR >60 >60 mL/min/1.73/m2   CBC with Differential    Collection Time: 07/18/25  3:57 AM   Result Value Ref Range    WBC 6.48 3.90 - 12.70 K/uL    RBC 3.62 (L) 4.00  - 5.40 M/uL    HGB 10.9 (L) 12.0 - 16.0 gm/dL    HCT 32.8 (L) 37.0 - 48.5 %    MCV 91 82 - 98 fL    MCH 30.1 27.0 - 31.0 pg    MCHC 33.2 32.0 - 36.0 g/dL    RDW 11.3 (L) 11.5 - 14.5 %    Platelet Count 215 150 - 450 K/uL    MPV 9.6 9.2 - 12.9 fL    Nucleated RBC 0 <=0 /100 WBC    Neut % 56.0 38 - 73 %    Lymph % 35.5 18 - 48 %    Mono % 7.6 4 - 15 %    Eos % 0.3 <=8 %    Basophil % 0.3 <=1.9 %    Imm Grans % 0.3 0.0 - 0.5 %    Neut # 3.63 1.8 - 7.7 K/uL    Lymph # 2.30 1 - 4.8 K/uL    Mono # 0.49 0.3 - 1 K/uL    Eos # 0.02 <=0.5 K/uL    Baso # 0.02 <=0.2 K/uL    Imm Grans # 0.02 0.00 - 0.04 K/uL   Hepatitis panel, acute    Collection Time: 07/18/25  3:57 AM   Result Value Ref Range    Hep BsAg Interp Non-Reactive Non-Reactive    Hep B Core IgM Interp Non-Reactive Non-Reactive    Hep A IgM Interp Non-Reactive Non-Reactive    Hep C Ab Interp Non-Reactive Non-Reactive   Ethanol    Collection Time: 07/18/25  3:57 AM   Result Value Ref Range    Alcohol, Serum <10 <10 mg/dL   Acetaminophen Level    Collection Time: 07/18/25  3:57 AM   Result Value Ref Range    Acetaminophen Level <3.0 (L) 10.0 - 20.0 ug/ml   High sensitivity CRP    Collection Time: 07/18/25 12:11 PM   Result Value Ref Range    CRP, High Sensitivity 2.08 <=3.19 mg/L   Ceruloplasmin    Collection Time: 07/18/25  5:47 PM   Result Value Ref Range    Ceruloplasmin 20.0 15.0 - 45.0 mg/dL   Iron and TIBC    Collection Time: 07/18/25  5:47 PM   Result Value Ref Range    Iron Level 89 30 - 160 ug/dL    Transferrin 163 (L) 200 - 375 mg/dL    Iron Binding Capacity Total 241 (L) 250 - 450 ug/dL    Iron Saturation 37 20 - 50 %   Comprehensive Metabolic Panel (CMP)    Collection Time: 07/19/25  3:00 AM   Result Value Ref Range    Sodium 136 136 - 145 mmol/L    Potassium 3.7 3.5 - 5.1 mmol/L    Chloride 106 95 - 110 mmol/L    CO2 22 (L) 23 - 29 mmol/L    Glucose 67 (L) 70 - 110 mg/dL    BUN 13 6 - 20 mg/dL    Creatinine 0.7 0.5 - 1.4 mg/dL    Calcium 8.1 (L) 8.7 - 10.5  mg/dL    Protein Total 5.2 (L) 6.0 - 8.4 gm/dL    Albumin 3.0 (L) 3.5 - 5.2 g/dL    Bilirubin Total 0.6 0.1 - 1.0 mg/dL    ALP 50 40 - 150 unit/L    AST 92 (H) 11 - 45 unit/L     (H) 10 - 44 unit/L    Anion Gap 8 8 - 16 mmol/L    eGFR >60 >60 mL/min/1.73/m2   CBC with Differential    Collection Time: 07/19/25  3:00 AM   Result Value Ref Range    WBC 5.49 3.90 - 12.70 K/uL    RBC 3.28 (L) 4.00 - 5.40 M/uL    HGB 10.1 (L) 12.0 - 16.0 gm/dL    HCT 29.2 (L) 37.0 - 48.5 %    MCV 89 82 - 98 fL    MCH 30.8 27.0 - 31.0 pg    MCHC 34.6 32.0 - 36.0 g/dL    RDW 10.9 (L) 11.5 - 14.5 %    Platelet Count 200 150 - 450 K/uL    MPV 9.8 9.2 - 12.9 fL    Nucleated RBC 0 <=0 /100 WBC    Neut % 30.3 (L) 38 - 73 %    Lymph % 60.3 (H) 18 - 48 %    Mono % 6.9 4 - 15 %    Eos % 1.8 <=8 %    Basophil % 0.5 <=1.9 %    Imm Grans % 0.2 0.0 - 0.5 %    Neut # 1.66 (L) 1.8 - 7.7 K/uL    Lymph # 3.31 1 - 4.8 K/uL    Mono # 0.38 0.3 - 1 K/uL    Eos # 0.10 <=0.5 K/uL    Baso # 0.03 <=0.2 K/uL    Imm Grans # 0.01 0.00 - 0.04 K/uL   POCT glucose    Collection Time: 07/19/25  9:30 AM   Result Value Ref Range    POCT Glucose 63 (L) 70 - 110 mg/dL   Protime-INR    Collection Time: 07/19/25 10:38 AM   Result Value Ref Range    PT 12.6 (H) 9.0 - 12.5 seconds    INR 1.1 0.8 - 1.2   POCT glucose    Collection Time: 07/19/25 11:14 AM   Result Value Ref Range    POCT Glucose 86 70 - 110 mg/dL       Imaging Results:  Imaging Results              US Pelvis Comp with Transvag NON-OB (xpd) (Final result)  Result time 07/17/25 14:59:38   Procedure changed from US Pelvis Complete Non OB     Final result by Brandon Painter MD (07/17/25 14:59:38)                   Impression:      1. Trace free fluid in the pelvis.  2. No hemoperitoneum demonstrated.  3. Upon review of the previous ultrasound I believe the previous findings probably represent a combination of diarrheal illness (fluid in the cecum deep in the pelvis) and the displaced bladder.  I do not see  hemoperitoneum on the previous CT.      Electronically signed by: Brandon Conway  Date:    07/17/2025  Time:    14:59               Narrative:    EXAMINATION:  US PELVIS COMP WITH TRANSVAG NON-OB (XPD)    CLINICAL HISTORY:  Hemoperitoneum;    TECHNIQUE:  Endovaginal ultrasound with initial transabdominal images. Color-flow Doppler with spectral waveforms included.    COMPARISON:  CT abdomen pelvis July 17, 2025.    FINDINGS:  Uterus:The uterus measures 8.8 x 4.7 x 4.8 cm.  Uterus has an unremarkable appearance.  Endometrial stripe measures 4 mm.    Right ovary: Normal in size and appearance.  Normal blood flow.  Benign cyst.    Left ovary: Normal in size and appearance.  Normal blood flow.    Trace free fluid is seen in the pelvis.                                       CT Abdomen Pelvis With IV Contrast NO Oral Contrast (Final result)  Result time 07/17/25 13:40:06      Final result by Juanjose Cross MD (07/17/25 13:40:06)                   Impression:      Small to moderate volume suspected hemorrhagic fluid in the pelvis.  Recommend correlation with pelvic ultrasound.    Possible diarrheal illness.    The appendix is not identified.  Further evaluation as needed.    Periportal edema, nonspecific.  Correlation and further evaluation as warranted.    Complete findings as above.    All CT scans at this facility are performed  using dose modulation techniques as appropriate to performed exam including the following:  automated exposure control; adjustment of mA and/or kV according to the patients size (this includes techniques or standardized protocols for targeted exams where dose is matched to indication/reason for exam: i.e. extremities or head);  iterative reconstruction technique.      Electronically signed by: Juanjose Cross  Date:    07/17/2025  Time:    13:40               Narrative:    EXAMINATION:  CT ABDOMEN PELVIS WITH IV CONTRAST    CLINICAL HISTORY:  RLQ abdominal pain (Age >= 14y);    TECHNIQUE:  Low  dose axial images, sagittal and coronal reformations were obtained from the lung bases to the pubic symphysis.  Contrast was administered.  Images acquired after administration 100 mL Omnipaque 350 IV contrast.    COMPARISON:  None    FINDINGS:  Heart: Normal in size. No pericardial effusion.    Lung Bases: Well aerated, without consolidation or pleural fluid.    Liver: Small amount of periportal edema suspected, nonspecific.  Correlation with liver enzymes would be suggested.    Gallbladder: Gallbladder surgically absent.    Bile Ducts: No evidence of dilated ducts.    Pancreas: No mass or peripancreatic fat stranding.    Spleen: Unremarkable.    Adrenals: Unremarkable.    Kidneys/ Ureters: No hydronephrosis or obstructive uropathy.    Bladder: No evidence of wall thickening.    Reproductive organs: Bilateral ovarian cysts and follicles. Small to moderate volume suspected hemorrhagic fluid in the pelvis. Recommend correlation with pelvic ultrasound.    GI Tract/Mesentery: No evidence of bowel obstruction or inflammation.  The appendix is not identified.  Fluid-filled colon concerning for diarrheal illness.  Correlation is advised.    Peritoneal Space: No ascites. No free air.    Retroperitoneum: No significant adenopathy.    Abdominal wall: Unremarkable.    Vasculature: No significant atherosclerosis or aneurysm.    Bones: No acute fracture.                                       The EKG was ordered, reviewed, and independently interpreted by the ED provider.  Interpretation time: 13:52  Rate: 95 BPM  Rhythm: normal sinus rhythm  Interpretation: No acute ST wave abnormalities. No STEMI.             The Emergency Provider reviewed the vital signs and test results, which are outlined above.     ED Discussion     3:29 PM: Discussed pt's case with Dr. Funez (Obstetrics and Gynecology) who says that the patient recently had a completely normal [facility] and again today, so she highly doubts this  is a gynecology problem. There is a follicle on the right ovary, but it is too small to cause significant pain. The source is likely from something else. She looked at the ultrasound images and she does not see much free fluid in the pelvis at all. She also looked at the CT and the colon looks pretty distended to her. After looking at the ultrasound for a second time, she believes to see a large volume of stool anterior to the uterus.    5:37 PM: Discussed pt's case with Dr. Lindquist (Surgical Oncology) who says that it looks like fluid filled bowel. She does not see anything concerning on the imaging.     6:26 PM: Discussed pt's case with Dr. Lindquist (Surgical Oncology) who says the scan doesn't show any secondary signs of appendicitis (no stranding or inflammation in that area), and WBC is normal. This would make the likelihood of appendicitis low. She will come see her but she suspects diarrheal illness is the problem.     6:52 PM: Discussed pt's case with Dr. Lindquist (Surgical Oncology) who says patient should be observed and if imaging with PO contrast still doesn't show the appendix then her appendix will be taken out tomorrow. She suspects this to be an irritable bowel problem.     7:02 PM: Discussed case with Dr. Harmon (Hospital Medicine). Dr. Harmon agrees with current care and management of pt and accepts admission.   Admitting Service: Hospital Medicine  Admitting Physician: Dr. Harmon  Admit to: Med/Tele    7:02 PM: Re-evaluated pt. I have discussed test results, shared treatment plan, and the need for admission with patient/family/caretaker at bedside. Pt and/or family/caretaker express understanding at this time and agree with all information. All questions answered. Pt/caretaker/family member(s) have no further questions or concerns at this time. Pt is ready for admit.            ED Course as of 07/19/25 1617   Thu Jul 17, 2025   1345 BILIRUBIN TOTAL: 0.8 [NF]   1345 ALP: 51 [NF]   1345 AST: 13 [NF]    1345 ALT: 11 [NF]   1345 Hemoglobin: 13.0 [NF]   1345 Hematocrit: 37.8 [NF]   1345 CT Abdomen Pelvis With IV Contrast NO Oral Contrast  Bilateral ovarian cysts and follicles. Small to moderate volume suspected hemorrhagic fluid in the pelvis. Recommend correlation with pelvic ultrasound. [NF]   1354 EKG reviewed.  Normal sinus rhythm.  Normal intervals, axis.  No ST or T-wave changes. [NF]   1519 34-year-old female presents with a right lower quadrant abdominal pain with the associated nausea, vomiting, diarrhea as well as anorexia.  CT abdomen and pelvis with IV contrast was read as Small to moderate volume suspected hemorrhagic fluid in the pelvis.  Recommend correlation with pelvic ultrasound.  Pelvic ultrasound read as trace free fluid in the pelvis. No hemoperitoneum demonstrated. Upon review of the previous ultrasound I believe the previous findings probably represent a combination of diarrheal illness (fluid in the cecum deep in the pelvis) and the displaced bladder.  I do not see hemoperitoneum on the previous CT.  However patient appears to have pain out of proportion and her H&H has dropped from 13.0/37.8 to 11.8/34.8 and 3 hours.  Patient is a while here with a normal white blood cell count.  Urine may be infected versus contamination.  Cultures pending.  Patient treated with IV pain medication as well as fluids and antibiotics to cover for both urine and GI pathology. [NF]      ED Course User Index  [NF] Tara Beaulieu., DO     Medical Decision Making  Amount and/or Complexity of Data Reviewed  Labs: ordered. Decision-making details documented in ED Course.  Radiology: ordered. Decision-making details documented in ED Course.  ECG/medicine tests: ordered and independent interpretation performed. Decision-making details documented in ED Course.    Risk  Prescription drug management.       Additional MDM:   Differential Diagnosis:   Appendicitis, ruptured ovarian cyst, ovarian torsion, gastroenteritis.  .             ED Medication(s):  Medications   HYDROcodone-acetaminophen 7.5-325 mg per tablet 1 tablet (1 tablet Oral Given 7/19/25 0314)   sodium chloride 0.9% flush 3 mL (has no administration in time range)   ondansetron injection 4 mg (4 mg Intravenous Given 7/19/25 1033)   promethazine tablet 25 mg (has no administration in time range)   naloxone 0.4 mg/mL injection 0.02 mg (has no administration in time range)   glucose chewable tablet 16 g (has no administration in time range)   glucose chewable tablet 24 g (has no administration in time range)   dextrose 50% injection 12.5 g (has no administration in time range)   dextrose 50% injection 25 g (has no administration in time range)   glucagon (human recombinant) injection 1 mg (has no administration in time range)   famotidine IVPB 20 mg (0 mg Intravenous Stopped 7/19/25 0956)   fluticasone propionate 50 mcg/actuation nasal spray 50 mcg (50 mcg Each Nostril Not Given 7/19/25 0925)   hydrOXYzine pamoate capsule 50 mg (has no administration in time range)   amoxicillin-clavulanate 875-125mg per tablet 1 tablet (1 tablet Oral Given 7/19/25 0925)   sodium chloride 0.9% bolus 1,000 mL 1,000 mL (0 mLs Intravenous Stopped 7/17/25 1257)   ondansetron injection 4 mg (4 mg Intravenous Given 7/17/25 1158)   morphine injection 4 mg (4 mg Intravenous Given 7/17/25 1158)   piperacillin-tazobactam (ZOSYN) 4.5 g in D5W 100 mL IVPB (MB+) (0 g Intravenous Stopped 7/17/25 1356)   ketorolac injection 15 mg (15 mg Intravenous Given 7/17/25 1248)   iohexoL (OMNIPAQUE 350) injection 100 mL (100 mLs Intravenous Given 7/17/25 1302)   HYDROmorphone injection 1 mg (1 mg Intravenous Given 7/17/25 1355)   HYDROmorphone injection 1 mg (1 mg Intravenous Given 7/17/25 1745)   lactated ringers infusion (0 mLs Intravenous Stopped 7/17/25 2100)   butalbital-acetaminophen-caffeine -40 mg per tablet 1 tablet (1 tablet Oral Given 7/17/25 2364)   iohexoL (OMNIPAQUE 12) oral solution 1,000 mL  (1,000 mLs Oral Given 7/18/25 0900)   butalbital-acetaminophen-caffeine -40 mg per tablet 1 tablet (1 tablet Oral Given 7/19/25 0635)       Discharge Medication List as of 7/19/2025  2:39 PM        START taking these medications    Details   amoxicillin-clavulanate 875-125mg (AUGMENTIN) 875-125 mg per tablet Take 1 tablet by mouth every 12 (twelve) hours. for 7 days, Starting Sat 7/19/2025, Until Sat 7/26/2025, Normal      HYDROcodone-acetaminophen (NORCO) 7.5-325 mg per tablet Take 1 tablet by mouth every 8 (eight) hours as needed for Pain., Starting Sat 7/19/2025, Until Mon 7/21/2025 at 2359, Normal              Follow-up Information       Shahzad Forte MD. Schedule an appointment as soon as possible for a visit in 3 day(s).    Specialty: Family Medicine  Contact information:  [address] [facility] [address]  [phone]               Jesse Ling MD. Schedule an appointment as soon as possible for a visit.    Specialty: Gastroenterology  Why: Followup Abd Pain  Contact information:  [address] [place] [address] Janie  [facility] [address]  [phone]               Shaun Ramírez MD. Schedule an appointment as soon as possible for a visit.    Specialties: Transplant, Hepatology  Why: Followup LFTs  Contact information:  [address] [place] [address] Dr Daina BOSS [address]  [phone]                                 Scribe Attestation:   Scribe #1: I performed the above scribed service and the documentation accurately describes the services I performed. I attest to the accuracy of the note.     Attending:   Physician Attestation Statement for Scribe #1: I, Tara Beaulieu, DO, personally performed the services described in this documentation, as scribed by Argelia Solis, in my presence, and it is both accurate and complete.           Clinical Impression       ICD-10-CM ICD-9-CM   1. Transaminitis  R74.01 790.4   2. RLQ abdominal pain  R10.31 789.03   3. Nausea  R11.0 787.02   4.  Loose stools  R19.5 787.7       Disposition:   Disposition: Placed in Observation  Condition: Tara Alvarado DO  07/19/25 1617

## 2025-07-18 PROBLEM — R74.01 TRANSAMINITIS: Status: ACTIVE | Noted: 2025-07-18

## 2025-07-18 PROBLEM — R51.9 HEADACHE: Status: RESOLVED | Noted: 2025-07-17 | Resolved: 2025-07-18

## 2025-07-18 LAB
ABSOLUTE EOSINOPHIL (OHS): 0.02 K/UL
ABSOLUTE MONOCYTE (OHS): 0.49 K/UL (ref 0.3–1)
ABSOLUTE NEUTROPHIL COUNT (OHS): 3.63 K/UL (ref 1.8–7.7)
ALBUMIN SERPL BCP-MCNC: 3.3 G/DL (ref 3.5–5.2)
ALP SERPL-CCNC: 62 UNIT/L (ref 40–150)
ALT SERPL W/O P-5'-P-CCNC: 461 UNIT/L (ref 10–44)
ANION GAP (OHS): 6 MMOL/L (ref 8–16)
APAP SERPL-MCNC: <3 UG/ML (ref 10–20)
AST SERPL-CCNC: 316 UNIT/L (ref 11–45)
BASOPHILS # BLD AUTO: 0.02 K/UL
BASOPHILS NFR BLD AUTO: 0.3 %
BILIRUB SERPL-MCNC: 0.9 MG/DL (ref 0.1–1)
BUN SERPL-MCNC: 15 MG/DL (ref 6–20)
CALCIUM SERPL-MCNC: 8.4 MG/DL (ref 8.7–10.5)
CHLORIDE SERPL-SCNC: 107 MMOL/L (ref 95–110)
CO2 SERPL-SCNC: 25 MMOL/L (ref 23–29)
CREAT SERPL-MCNC: 0.7 MG/DL (ref 0.5–1.4)
CRP SERPL-MCNC: 2.08 MG/L
ERYTHROCYTE [DISTWIDTH] IN BLOOD BY AUTOMATED COUNT: 11.3 % (ref 11.5–14.5)
ETHANOL SERPL-MCNC: <10 MG/DL
GFR SERPLBLD CREATININE-BSD FMLA CKD-EPI: >60 ML/MIN/1.73/M2
GLUCOSE SERPL-MCNC: 86 MG/DL (ref 70–110)
HAV IGM SERPL QL IA: NORMAL
HBV CORE IGM SERPL QL IA: NORMAL
HBV SURFACE AG SERPL QL IA: NORMAL
HCT VFR BLD AUTO: 32.8 % (ref 37–48.5)
HCV AB SERPL QL IA: NORMAL
HGB BLD-MCNC: 10.9 GM/DL (ref 12–16)
HOLD SPECIMEN: NORMAL
IMM GRANULOCYTES # BLD AUTO: 0.02 K/UL (ref 0–0.04)
IMM GRANULOCYTES NFR BLD AUTO: 0.3 % (ref 0–0.5)
LYMPHOCYTES # BLD AUTO: 2.3 K/UL (ref 1–4.8)
MCH RBC QN AUTO: 30.1 PG (ref 27–31)
MCHC RBC AUTO-ENTMCNC: 33.2 G/DL (ref 32–36)
MCV RBC AUTO: 91 FL (ref 82–98)
NUCLEATED RBC (/100WBC) (OHS): 0 /100 WBC
PLATELET # BLD AUTO: 215 K/UL (ref 150–450)
PMV BLD AUTO: 9.6 FL (ref 9.2–12.9)
POTASSIUM SERPL-SCNC: 4 MMOL/L (ref 3.5–5.1)
PROT SERPL-MCNC: 5.8 GM/DL (ref 6–8.4)
RBC # BLD AUTO: 3.62 M/UL (ref 4–5.4)
RELATIVE EOSINOPHIL (OHS): 0.3 %
RELATIVE LYMPHOCYTE (OHS): 35.5 % (ref 18–48)
RELATIVE MONOCYTE (OHS): 7.6 % (ref 4–15)
RELATIVE NEUTROPHIL (OHS): 56 % (ref 38–73)
SODIUM SERPL-SCNC: 138 MMOL/L (ref 136–145)
WBC # BLD AUTO: 6.48 K/UL (ref 3.9–12.7)

## 2025-07-18 PROCEDURE — 82077 ASSAY SPEC XCP UR&BREATH IA: CPT | Performed by: INTERNAL MEDICINE

## 2025-07-18 PROCEDURE — 80143 DRUG ASSAY ACETAMINOPHEN: CPT | Performed by: INTERNAL MEDICINE

## 2025-07-18 PROCEDURE — 25000003 PHARM REV CODE 250: Performed by: NURSE PRACTITIONER

## 2025-07-18 PROCEDURE — 36415 COLL VENOUS BLD VENIPUNCTURE: CPT | Performed by: INTERNAL MEDICINE

## 2025-07-18 PROCEDURE — 25500020 PHARM REV CODE 255: Performed by: INTERNAL MEDICINE

## 2025-07-18 PROCEDURE — 82390 ASSAY OF CERULOPLASMIN: CPT | Performed by: INTERNAL MEDICINE

## 2025-07-18 PROCEDURE — 96368 THER/DIAG CONCURRENT INF: CPT

## 2025-07-18 PROCEDURE — A9698 NON-RAD CONTRAST MATERIALNOC: HCPCS | Performed by: INTERNAL MEDICINE

## 2025-07-18 PROCEDURE — 36415 COLL VENOUS BLD VENIPUNCTURE: CPT | Performed by: NURSE PRACTITIONER

## 2025-07-18 PROCEDURE — 63600175 PHARM REV CODE 636 W HCPCS: Performed by: INTERNAL MEDICINE

## 2025-07-18 PROCEDURE — G0378 HOSPITAL OBSERVATION PER HR: HCPCS

## 2025-07-18 PROCEDURE — 96366 THER/PROPH/DIAG IV INF ADDON: CPT

## 2025-07-18 PROCEDURE — 63600175 PHARM REV CODE 636 W HCPCS: Performed by: NURSE PRACTITIONER

## 2025-07-18 PROCEDURE — 96361 HYDRATE IV INFUSION ADD-ON: CPT

## 2025-07-18 PROCEDURE — 86015 ACTIN ANTIBODY EACH: CPT | Performed by: INTERNAL MEDICINE

## 2025-07-18 PROCEDURE — 99900035 HC TECH TIME PER 15 MIN (STAT)

## 2025-07-18 PROCEDURE — 96376 TX/PRO/DX INJ SAME DRUG ADON: CPT

## 2025-07-18 PROCEDURE — 80074 ACUTE HEPATITIS PANEL: CPT | Performed by: INTERNAL MEDICINE

## 2025-07-18 PROCEDURE — 85025 COMPLETE CBC W/AUTO DIFF WBC: CPT | Performed by: NURSE PRACTITIONER

## 2025-07-18 PROCEDURE — 63600175 PHARM REV CODE 636 W HCPCS: Performed by: STUDENT IN AN ORGANIZED HEALTH CARE EDUCATION/TRAINING PROGRAM

## 2025-07-18 PROCEDURE — 99204 OFFICE O/P NEW MOD 45 MIN: CPT | Mod: ,,, | Performed by: INTERNAL MEDICINE

## 2025-07-18 PROCEDURE — 99213 OFFICE O/P EST LOW 20 MIN: CPT | Mod: ,,, | Performed by: SURGERY

## 2025-07-18 PROCEDURE — 80053 COMPREHEN METABOLIC PANEL: CPT | Performed by: NURSE PRACTITIONER

## 2025-07-18 PROCEDURE — 83540 ASSAY OF IRON: CPT | Performed by: INTERNAL MEDICINE

## 2025-07-18 PROCEDURE — 86381 MITOCHONDRIAL ANTIBODY EACH: CPT | Performed by: INTERNAL MEDICINE

## 2025-07-18 PROCEDURE — 86141 C-REACTIVE PROTEIN HS: CPT | Performed by: INTERNAL MEDICINE

## 2025-07-18 PROCEDURE — 82103 ALPHA-1-ANTITRYPSIN TOTAL: CPT | Performed by: INTERNAL MEDICINE

## 2025-07-18 RX ADMIN — ONDANSETRON 4 MG: 2 INJECTION INTRAMUSCULAR; INTRAVENOUS at 08:07

## 2025-07-18 RX ADMIN — ONDANSETRON 4 MG: 2 INJECTION INTRAMUSCULAR; INTRAVENOUS at 02:07

## 2025-07-18 RX ADMIN — SODIUM CHLORIDE, POTASSIUM CHLORIDE, SODIUM LACTATE AND CALCIUM CHLORIDE: 600; 310; 30; 20 INJECTION, SOLUTION INTRAVENOUS at 05:07

## 2025-07-18 RX ADMIN — HYDROMORPHONE HYDROCHLORIDE 0.5 MG: 1 INJECTION, SOLUTION INTRAMUSCULAR; INTRAVENOUS; SUBCUTANEOUS at 08:07

## 2025-07-18 RX ADMIN — PIPERACILLIN SODIUM AND TAZOBACTAM SODIUM 4.5 G: 4; .5 INJECTION, POWDER, FOR SOLUTION INTRAVENOUS at 12:07

## 2025-07-18 RX ADMIN — FAMOTIDINE 20 MG: 20 INJECTION, SOLUTION INTRAVENOUS at 08:07

## 2025-07-18 RX ADMIN — SODIUM CHLORIDE, POTASSIUM CHLORIDE, SODIUM LACTATE AND CALCIUM CHLORIDE: 600; 310; 30; 20 INJECTION, SOLUTION INTRAVENOUS at 10:07

## 2025-07-18 RX ADMIN — PIPERACILLIN SODIUM AND TAZOBACTAM SODIUM 4.5 G: 4; .5 INJECTION, POWDER, FOR SOLUTION INTRAVENOUS at 09:07

## 2025-07-18 RX ADMIN — PIPERACILLIN SODIUM AND TAZOBACTAM SODIUM 4.5 G: 4; .5 INJECTION, POWDER, FOR SOLUTION INTRAVENOUS at 08:07

## 2025-07-18 RX ADMIN — HYDROMORPHONE HYDROCHLORIDE 0.5 MG: 1 INJECTION, SOLUTION INTRAMUSCULAR; INTRAVENOUS; SUBCUTANEOUS at 01:07

## 2025-07-18 RX ADMIN — SODIUM CHLORIDE, POTASSIUM CHLORIDE, SODIUM LACTATE AND CALCIUM CHLORIDE: 600; 310; 30; 20 INJECTION, SOLUTION INTRAVENOUS at 09:07

## 2025-07-18 RX ADMIN — IOHEXOL 1000 ML: 12 SOLUTION ORAL at 09:07

## 2025-07-18 NOTE — ASSESSMENT & PLAN NOTE
"Initial CT abd with "small to moderate volume suspected hemorrhagic fluid in pelvis, possible diarrheal illness, nonspecific periportal edema" Subsequent pelvic ultrasound showed "trace free fluid in pelvis, no hemoperitoneum demonstrated"   General surgery consulted per ED  and recommended serial abd exams and repeat CT abd with PO and IV contrast which is pending   Continue IV fluids pending CT/NPO status   Continue empiric IV Zosyn for UTI   PRN pain medication ordered  Has been taking ibuprofen 800 to 1600 mg per day for chronic back/neck pain  - Continue IV Pepcid      "

## 2025-07-18 NOTE — ASSESSMENT & PLAN NOTE
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  Continue PRN Atarax

## 2025-07-18 NOTE — ASSESSMENT & PLAN NOTE
Pt w/ agus-umbilical pain radiating to RLQ with 2 day history of nausea/ vomiting/ diarrhea.  On my exam she is non-tender on exam to deep palpation.  She has no leukocytosis and no clear evidence of appendicitis/ inflammation on CT scan.  I discussed with her that there are several things about her that presentation that are consistent with appendicitis but her exam, imaging, and labs are not.  I think it is reasonable to observe her overnight, and repeat her CT scan with PO contrast to see if we can see the appendix.  If we still cannot visualize the appendix and she has persistent pain it is very reasonable to remove her appendix.      -- obs to medicine  -- repeat CT scan with PO contrast  -- serial abdominal exams  -- remainder of care as per primary team

## 2025-07-18 NOTE — ASSESSMENT & PLAN NOTE
MRCP has been ordered. Pending results. I also recommend serology work up for viral and autoimmune hepatitis.     See orders.     Transaminitis  -     MRI MRCP Abdomen WO Contrast 3D WO Independent WS XPD; Standing  -     Calprotectin, Stool; Standing  -     High sensitivity CRP; Standing  -     AMA, ASMA, ceruloplasmin, A1AT, Hep A, B and C serology.

## 2025-07-18 NOTE — PLAN OF CARE
"1:35 AM  7/17/25    CC: RUQ pain, NVD, UTI  Neuro: A/Ox4  Cardiac: NSR on tele  Resp: RA  GI/: LBM 7/17  Skin: intact  Pain: 7/10 HA and RUQ pain  Mobility: SBA  IV: 20g L AC and 22g R ant FA. IV Abx and LR @ 100  Plan of Care: POC reviewed with pt. Pt verbalizes understanding of POC. No questions at this time.  Safety: bed in lowest position, purposeful rounding, call light in reach, personal items in reach, call for assistance when needed. Pt verbalizes understanding. Will continue to monitor.       /70 (BP Location: Right arm, Patient Position: Lying)   Pulse 61   Temp 98.5 °F (36.9 °C) (Oral)   Resp 16   Ht 5' 2" (1.575 m)   Wt 59 kg (130 lb 1.1 oz)   LMP 07/03/2025 (Approximate)   SpO2 97%   Breastfeeding No   BMI 23.79 kg/m²     Mery Solis RN  07/18/2025    "

## 2025-07-18 NOTE — ASSESSMENT & PLAN NOTE
Gastrointestinal Causes  Acute Gastroenteritis (likely viral or bacterial) Given 2-day history of vomiting, diarrhea, and distended cecum with fluid.  Irritable Bowel Syndrome (IBS) Considered due to chronic constipation, but less likely with acute symptoms and abnormal imaging.  Appendicitis Initial concern based on RLQ pain; ruled out after appendix was visualized and appeared normal on repeat CT.  Ovarian Cyst Complication (e.g., rupture or torsion) RLQ pain and trace pelvic fluid could relate to gynecologic pathology.  Abdominal Wall Pain/Post-surgical Adhesions Prior hernia repair with mesh and history of surgeries may contribute to pain exacerbated by movement.  2.  Hepatobiliary Causes (relevant to elevated liver enzymes)  Gallbladder-related disorder despite cholecystectomy Persistent RUQ pain post-cholecystectomy raises concern for biliary dyskinesia or retained stones.  Hepatitis (viral, autoimmune, or drug-induced) Elevated liver enzymes warrant consideration; may present subtly.  Nonalcoholic Fatty Liver Disease (NAFLD) Possible contributor if enzymes are chronically elevated.  Biliary obstruction or cholangitis Less likely with normal WBC, afebrile status, and no imaging suggestive of obstruction.  3. Genitourinary Causes  Urinary Tract Infection/Pyelonephritis Supported by urinalysis with WBCs, bacteria, and leukocyte esterase; flank pain could be consistent.  4. Medication or Substance-Related  Acetaminophen or other hepatotoxic drugs Could explain liver enzyme elevation if taken excessively during symptom onset.    MRCP ordered.

## 2025-07-18 NOTE — SUBJECTIVE & OBJECTIVE
Interval History: PT admitted overnight. Seen and examined, no family at bedside during rounds. PT reports continued RLQ pain associated with nausea and dry heaving. Pain worsened and radiated to midline after receiving IV morphine. Somewhat alleviated with IV dilaudid. Denies diarrhea, emesis.     Review of Systems  Objective:     Vital Signs (Most Recent):  Temp: 97.8 °F (36.6 °C) (07/18/25 0755)  Pulse: 74 (07/18/25 0755)  Resp: 16 (07/18/25 0852)  BP: 125/75 (07/18/25 0755)  SpO2: 98 % (07/18/25 0755) Vital Signs (24h Range):  Temp:  [97.2 °F (36.2 °C)-98.5 °F (36.9 °C)] 97.8 °F (36.6 °C)  Pulse:  [] 74  Resp:  [14-19] 16  SpO2:  [95 %-98 %] 98 %  BP: (101-157)/(58-96) 125/75     Weight: 59 kg (130 lb 1.1 oz)  Body mass index is 23.79 kg/m².    Intake/Output Summary (Last 24 hours) at 7/18/2025 0858  Last data filed at 7/18/2025 0600  Gross per 24 hour   Intake 2258.54 ml   Output --   Net 2258.54 ml         Physical Exam  Vitals and nursing note reviewed.   Constitutional:       General: She is not in acute distress.     Appearance: She is not ill-appearing.   HENT:      Head: Normocephalic.      Mouth/Throat:      Mouth: Mucous membranes are dry.      Pharynx: Oropharynx is clear.   Cardiovascular:      Rate and Rhythm: Normal rate and regular rhythm.      Heart sounds: No murmur heard.  Pulmonary:      Effort: Pulmonary effort is normal.      Breath sounds: Normal breath sounds. No wheezing or rales.      Comments: On room air   Abdominal:      General: There is no distension.      Palpations: Abdomen is soft.      Tenderness: There is abdominal tenderness (RLQ TTP). There is no guarding or rebound.   Musculoskeletal:      Right lower leg: No edema.      Left lower leg: No edema.   Neurological:      Mental Status: She is alert.      Comments: Answers questions appropriately                Significant Labs: All pertinent labs within the past 24 hours have been reviewed.    Significant Imaging: I have  reviewed all pertinent imaging results/findings within the past 24 hours.

## 2025-07-18 NOTE — H&P
[facility] [address] [place] [address] Medicine  History & Physical    Patient Name: Anusha Cardozo  MRN: [id number]  Patient Class: OP- Observation  Admission Date: 7/17/2025  Attending Physician: Adonis Harmon DO   Primary Care Provider: Shahzad Forte MD         Patient information was obtained from patient, spouse/SO, past medical records, and ER records.     Subjective:     Principal Problem:Right lower quadrant abdominal pain    Chief Complaint:   Chief Complaint   Patient presents with    Abdominal Pain     Pt states she is having pain in the RLQ of her abd for the past 2 days. 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  She complains of right lower quadrant and right flank pain which started 2-3 days ago and has progressively gotten worse.  She also reports 2 days of nausea, vomiting, dry heaving.  She shows a picture of formed stool with some mucus noted which she had 2 days ago, does report loose yellow colored mucus like stool 1 to 2 times a day for the past 2 days.  Denies fever, chills, dizziness, lightheadedness, chest pain, shortness of breath, edema, dysuria.  She does report urinating small amounts more frequently than usual.  She states that her pain is severe and constant, exacerbated with movement.  She reports that she previously had cholecystectomy and umbilical hernia repair with mesh.  She states that she has been taking ibuprofen 800-1600 mg per day for chronic neck and back pain.  She denies any previous history of peptic ulcer disease, states that she has never had EGD in the past.  She has been unable to keep any food or fluids down for past 2 days.  She does have history of  constipation in the past.  She denies any history of inflammatory bowel disease, reports that she had a colonoscopy sometime last year.  While in ED, afebrile, vital signs stable on room air.  Lab workup insignificant aside from UTI with   Cloudy yellow urine, negative nitrites, 1+ leukocyte esterase, 30 WBCs, moderate bacteria.  Urine culture in progress.   Urine hCG negative.  EKG-- normal sinus rhythm, heart rate 95, .  CT abdomen and pelvis with IV contrast /no oral contrast was done which showed small-to-moderate volume suspected hemorrhagic fluid in pelvis /pelvic ultrasound recommended; possible diarrheal illness, periportal edema, surgically absent gallbladder, no evidence of dilated bile ducts, no pancreatic mass or peripancreatic fat stranding, no hydronephrosis or obstructive uropathy, no evidence of bladder wall thickening, bilateral ovarian cysts and follicles noted with small-to-moderate volume suspected hemorrhagic fluid in pelvis, no ascites, no free air, appendix was not identified.  Transvaginal ultrasound was done which showed trace free fluid in pelvis, no hemoperitoneum-- radiology interpretation states that previous findings on CT likely represent combination of diarrheal illness with fluid in the cecum deep in the pelvis and displaced bladder.  General surgery was consulted and patient evaluated by Dr. Norma Lindquist, who recommended observation overnight and to repeat CT abdomen with oral contrast, plan for possible appendectomy if still cannot visualize appendix and/or having persistent abdominal pain. While in ED, she was given IV morphine 4 mg, IV Zofran, IV Zosyn IV dilaudid x 2 doses (1 mg each), IV toradol, NS 1L bolus, and  LR 1L bolus. Hospital Medicine consulted for admission due to abdominal pain.    Past Medical History:   Diagnosis Date    ADHD     Anxiety disorder, unspecified     Asthma     Cancer screening 11/26/2024    Divina MyRiad- MUTYH    Ovarian cyst     Left  ovary       Past Surgical History:   Procedure Laterality Date    CARPAL TUNNEL RELEASE      CERVICAL DISC SURGERY      CHOLECYSTECTOMY      CYST REMOVAL      ENDOMETRIAL ABLATION  10/2023    EXCISIONAL HEMORRHOIDECTOMY      x 2    HERNIA REPAIR  2022    umbilical with mesh    WRIST SURGERY         Review of patient's allergies indicates:  No Known Allergies    No current facility-administered medications on file prior to encounter.     Current Outpatient Medications on File Prior to Encounter   Medication Sig    ADDERALL 10 mg Tab     albuterol (PROVENTIL/VENTOLIN HFA) 90 mcg/actuation inhaler Inhale 2 puffs into the lungs every 6 (six) hours as needed for Wheezing. Rescue    ergocalciferol (ERGOCALCIFEROL) 50,000 unit Cap Take 1 capsule (50,000 Units total) by mouth every 7 days. (Patient not taking: Reported on 11/26/2024)    fluticasone propionate (FLONASE) 50 mcg/actuation nasal spray 1 spray (50 mcg total) by Each Nostril route once daily.    methocarbamoL (ROBAXIN) 500 MG Tab Take 1 tablet (500 mg total) by mouth 3 (three) times daily as needed. (Patient not taking: Reported on 11/26/2024)    MYDAYIS 37.5 mg CT24     prenatal 105-iron-folic ac-dha 30 mg iron- 1.4 mg-300 mg Cmpk Take by mouth. (Patient not taking: Reported on 11/26/2024)    venlafaxine (EFFEXOR-XR) 75 MG 24 hr capsule Take 75 mg by mouth. (Patient not taking: Reported on 11/26/2024)     Family History       Problem Relation (Age of Onset)    Cancer Maternal Grandmother    Colon cancer Maternal Grandfather    Diabetes Paternal Grandmother    Hypertension Maternal Grandmother, Paternal Grandmother    No Known Problems Mother, Father    Ovarian cancer Maternal Grandmother    Stroke Maternal Grandmother          Tobacco Use    Smoking status: Never    Smokeless tobacco: Never   Substance and Sexual Activity    Alcohol use: Yes     Comment: socially , rarely    Drug use: No    Sexual activity: Yes     Partners: Male     Birth control/protection:  Partner-Vasectomy     Review of Systems   Constitutional:  Positive for appetite change. Negative for chills, diaphoresis, fatigue and fever.        Decreased po intake past two days   HENT: Negative.  Negative for congestion, sinus pressure, sore throat and trouble swallowing.    Eyes: Negative.    Respiratory: Negative.  Negative for cough, chest tightness, shortness of breath and wheezing.    Cardiovascular: Negative.  Negative for chest pain, palpitations and leg swelling.   Gastrointestinal:  Positive for abdominal pain, nausea and vomiting. Negative for abdominal distention and blood in stool.        1-2 loose stools per day past two days, was constipated prior to that  Mucus noted in stool  Dry heaves x 2 days   Endocrine: Negative.    Genitourinary:  Positive for decreased urine volume, flank pain, frequency and urgency. Negative for difficulty urinating, dysuria, hematuria and pelvic pain.        Urinating more frequently, smaller amounts than usual  Mild right flank pain   Musculoskeletal:  Negative for arthralgias, back pain, myalgias and neck pain.   Skin: Negative.    Allergic/Immunologic: Negative.    Neurological:  Positive for headaches. Negative for dizziness, seizures, syncope, facial asymmetry, speech difficulty, weakness and light-headedness.   Psychiatric/Behavioral: Negative.     All other systems reviewed and are negative.    Objective:     Vital Signs (Most Recent):  Temp: 97.7 °F (36.5 °C) (07/17/25 1959)  Pulse: 83 (07/17/25 1959)  Resp: 18 (07/17/25 1959)  BP: 119/71 (07/17/25 1959)  SpO2: 97 % (07/17/25 1959) Vital Signs (24h Range):  Temp:  [97.7 °F (36.5 °C)-98.4 °F (36.9 °C)] 97.7 °F (36.5 °C)  Pulse:  [] 83  Resp:  [14-19] 18  SpO2:  [95 %-98 %] 97 %  BP: (117-157)/(62-96) 119/71     Weight: 59 kg (130 lb 1.1 oz)  Body mass index is 23.79 kg/m².     Physical Exam  Vitals reviewed.   Constitutional:       General: She is not in acute distress.     Appearance: She is  ill-appearing. She is not toxic-appearing or diaphoretic.   HENT:      Head: Normocephalic and atraumatic.      Nose: Nose normal.      Mouth/Throat:      Mouth: Mucous membranes are moist.      Pharynx: Oropharynx is clear.   Eyes:      Extraocular Movements: Extraocular movements intact.      Pupils: Pupils are equal, round, and reactive to light.   Cardiovascular:      Rate and Rhythm: Normal rate and regular rhythm.      Pulses: Normal pulses.      Heart sounds: Normal heart sounds.   Pulmonary:      Effort: Pulmonary effort is normal. No respiratory distress.      Breath sounds: Normal breath sounds. No stridor. No wheezing, rhonchi or rales.   Chest:      Chest wall: No tenderness.   Abdominal:      General: Bowel sounds are normal. There is no distension.      Palpations: Abdomen is soft. There is no mass.      Tenderness: There is abdominal tenderness. There is no right CVA tenderness, left CVA tenderness, guarding or rebound.      Hernia: No hernia is present.      Comments: Mild TTP RLQ   Musculoskeletal:         General: Normal range of motion.      Cervical back: Neck supple.      Right lower leg: No edema.      Left lower leg: No edema.   Skin:     General: Skin is warm and dry.      Capillary Refill: Capillary refill takes less than 2 seconds.   Neurological:      General: No focal deficit present.      Mental Status: She is alert and oriented to person, place, and time.      Motor: No weakness.   Psychiatric:         Mood and Affect: Mood normal.         Behavior: Behavior normal.         Thought Content: Thought content normal.              CRANIAL NERVES     CN III, IV, VI   Pupils are equal, round, and reactive to light.       Significant Labs: All pertinent labs within the past 24 hours have been reviewed.    CBC:   Recent Labs   Lab 07/17/25  1129 07/17/25  1440 07/17/25  1726   WBC 8.63  --   --    HGB 13.0 11.8* 11.5*   HCT 37.8 34.8* 34.3*     --   --      CMP:   Recent Labs   Lab  "07/17/25  1129      K 4.0      CO2 24   GLU 95   BUN 15   CREATININE 0.8   CALCIUM 9.5   PROT 7.5   ALBUMIN 4.3   BILITOT 0.8   ALKPHOS 51   AST 13   ALT 11   ANIONGAP 8     Lactic Acid: No results for input(s): "LACTATE" in the last 48 hours.  Lipase:   Recent Labs   Lab 07/17/25  1129   LIPASE 13     Urine Studies:   Recent Labs   Lab 07/17/25  1143   COLORU Yellow   APPEARANCEUA Cloudy*   PHUR 6.0   SPECGRAV >=1.030*   PROTEINUA 1+*   GLUCUA Negative   BILIRUBINUA Negative   OCCULTUA Trace*   NITRITE Negative   UROBILINOGEN 2.0-3.0*   LEUKOCYTESUR 1+*   RBCUA 2   WBCUA 30*   BACTERIA Moderate*   HYALINECASTS 0     Urine culture in progress    hCG urine negative    EKG - NSR    Significant Imaging: I have reviewed all pertinent imaging results/findings within the past 24 hours.    CT abdomen/pelvis with IV contrast only: colon is fluid filled, consistent with diarrheal illness, appendix not visualized, chronically dilated colon consistent with history of chronic constipation, small to moderate volume suspected hemorrhagic fluid in pelvis--recommend correlation with pelvic ultrasound    Ultrasound pelvis, transvag (non OB)--trace free fluid in pelvis, no hemoperitoneum demonstrated, radiology notes that previous findings probably represent combination of diarrheal illness with fluid in cecum deep in the pelvis and displaced bladder    Assessment/Plan:     Assessment & Plan  Right lower quadrant abdominal pain  General surgery consulted per ED and recommendations noted  Serial abdominal exams  Repeat CT abdomen with oral contrast for further evaluation  Hydrate with IV fluids  Zosyn for now, she does have UTI  PRN pain medication ordered  OK clear liquids at this time  Repeat labs in AM  Has been taking ibuprofen 800 to 1600 mg per day for chronic back/neck pain -- may need GI for EGD to rule out stomach ulcer  Start IV Pepcid BID tonight  --IBS ?  Possible stress ulcer ? (Under a lot of stress and has " been taking ibuprofen 800-1600 mg daily for back/thoracic pain)  Acute cystitis  IV Zosyn for now  Follow up urine culture    Loose stools  Initially constipated and had large stool with mucus noted, then afterwards has had 1-2 loose yellow stools per day over past two days  Has not had any oral intake over past two days due to nausea and dry heaves, and abdominal pain  Stool culture and stool for C diff ordered and pending collection    Nausea  PRN antiemetics  Try clear liquid diet tonight  Will keep NPO x medications after midnight   Repeat CT scan with oral contrast in AM    Anxiety  Over past few months RX noted for Atarax for anxiety, used to take Effexor  Increased stress -- 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 Adonis Harmon.           Mariama Jewell NP  Department of Hospital Medicine  [facility]

## 2025-07-18 NOTE — PROGRESS NOTES
[facility]  General Surgery  Progress Note    Subjective:     History of Present Illness:  Anusha Cardozo is a 34 y.o. female w/ hx anxiety, ADHD, and ovarian cysts who presented to the ER with a 2 day history of nausea, vomiting, diarrhea, and abdominal pain.  Pt states the pain started near her umbilicus about 2 days ago and is now located in her right flank/ RLQ.  She describes the pain as a pressure in that area, not cramping in nature.  Exacerbated by movement.  She has a history of abdominal pain and has previously had a cholecystectomy (but has persistent RUQ pain), and previous umbilical hernia repair with mesh.  She states she's been unable to keep down food or drink for 2 days.  She has a history of constipation and  had a colonoscopy last year (?) which she states was normal.  No family history of inflammatory bowel disease. On arrival to the ER she was afebrile, vitals WNL.  Labs were unremarkable- normal WBC, BUN, and Cr. Urinalysis was significant for WBC, bacteria, and leuk esterases.  CT abdomen/ pelvis showed distended cecum with fluid concerning for diarrhea illness without identification of the appendix.  There was original concern for small to moderate volume suspected hemorrhagic fluid in the pelvis.  Subsequent transvaginal ultrasound showed trace free fluid in the pelvis and on review of the previous imaging it was believed that the area initially thought to be hemoperitoneum was actually a combination of diarrheal illness (fluid in the cecum deep in the pelvis) and the displaced bladder.       Post-Op Info:  * No surgery found *         Interval History: AFVSS.  Still c/o nausea and abdominal pain.  Repeat CT scan with appendix visualized and no evidence of appendicitis.      Medications:  Continuous Infusions:   lactated ringers   Intravenous Continuous 100 mL/hr at 07/18/25 0926 New Bag at 07/18/25 0926     Scheduled Meds:   famotidine  20 mg Intravenous BID    fluticasone  propionate  1 spray Each Nostril Daily    piperacillin-tazobactam (Zosyn) IV (PEDS and ADULTS) (extended infusion is not appropriate)  4.5 g Intravenous Q8H     PRN Meds:  Current Facility-Administered Medications:     dextrose 50%, 12.5 g, Intravenous, PRN    dextrose 50%, 25 g, Intravenous, PRN    glucagon (human recombinant), 1 mg, Intramuscular, PRN    glucose, 16 g, Oral, PRN    glucose, 24 g, Oral, PRN    HYDROcodone-acetaminophen, 1 tablet, Oral, Q6H PRN    HYDROmorphone, 0.5 mg, Intravenous, Q6H PRN    hydrOXYzine pamoate, 50 mg, Oral, Q8H PRN    naloxone, 0.02 mg, Intravenous, PRN    ondansetron, 4 mg, Intravenous, Q6H PRN    promethazine, 25 mg, Oral, Q6H PRN    sodium chloride 0.9%, 3 mL, Intravenous, Q8H PRN     Review of patient's allergies indicates:  No Known Allergies  Objective:     Vital Signs (Most Recent):  Temp: 97.8 °F (36.6 °C) (07/18/25 0755)  Pulse: 74 (07/18/25 0755)  Resp: 16 (07/18/25 0852)  BP: 125/75 (07/18/25 0755)  SpO2: 98 % (07/18/25 0755) Vital Signs (24h Range):  Temp:  [97.2 °F (36.2 °C)-98.5 °F (36.9 °C)] 97.8 °F (36.6 °C)  Pulse:  [] 74  Resp:  [14-19] 16  SpO2:  [95 %-98 %] 98 %  BP: (101-157)/(58-96) 125/75     Weight: 59 kg (130 lb 1.1 oz)  Body mass index is 23.79 kg/m².    Intake/Output - Last 3 Shifts         07/16 0700 07/17 0659 07/17 0700 07/18 0659 07/18 0700 07/19 0659    I.V. (mL/kg)  1046.2 (17.7)     IV Piggyback  1212.3     Total Intake(mL/kg)  2258.5 (38.3)     Net  +2258.5                     Physical Exam  Constitutional:       General: She is not in acute distress.     Appearance: Normal appearance.   HENT:      Head: Normocephalic and atraumatic.   Eyes:      Extraocular Movements: Extraocular movements intact.      Conjunctiva/sclera: Conjunctivae normal.   Cardiovascular:      Rate and Rhythm: Normal rate and regular rhythm.   Pulmonary:      Effort: Pulmonary effort is normal.   Abdominal:      General: Abdomen is flat. There is no distension.       Palpations: Abdomen is soft. There is no mass.      Tenderness: There is no abdominal tenderness. There is no guarding or rebound.      Hernia: No hernia is present.   Musculoskeletal:         General: No swelling, tenderness, deformity or signs of injury. Normal range of motion.   Skin:     General: Skin is warm and dry.      Coloration: Skin is not jaundiced.   Neurological:      General: No focal deficit present.      Mental Status: She is alert and oriented to person, place, and time.   Psychiatric:         Mood and Affect: Mood normal.         Behavior: Behavior normal.         Thought Content: Thought content normal.          Significant Labs:  I have reviewed all pertinent lab results within the past 24 hours.  CBC:   Recent Labs   Lab 07/18/25  0357   WBC 6.48   RBC 3.62*   HGB 10.9*   HCT 32.8*      MCV 91   MCH 30.1   MCHC 33.2     BMP:   Recent Labs   Lab 07/18/25  0357   GLU 86      K 4.0      CO2 25   BUN 15   CREATININE 0.7   CALCIUM 8.4*       Significant Diagnostics:  I have reviewed all pertinent imaging results/findings within the past 24 hours.  CT: I have reviewed all pertinent results/findings within the past 24 hours and my personal findings are:  appendix visualized and fills with contrast, no evidence of appendicitis.   Assessment/Plan:     * Right lower quadrant abdominal pain  Pt still c/o nausea/ vomiting and abdominal pain.  No clear etiology of this as the appendix is normal and based on yesterdays US there is no acute gynecologic issue.  Differential includes irritable bowel - especially in a patient with chronic constipation and UGI issues.  In this patient with elevated LFTs and agus-portal inflammation I would also consider an inflammatory bowel disease (although her colonoscopy was reportedly normal).      -- no surgical intervention   -- recommend GI consult for evaluation   -- management as per primary team     Surgery signing off, please call with  questions        Norma Lindquist MD  General Surgery  [facility] [place] [facility]

## 2025-07-18 NOTE — ASSESSMENT & PLAN NOTE
This has resolved and patient wants to eat. I suggest advancing to clear diet if MRCP is negative.

## 2025-07-18 NOTE — HOSPITAL COURSE
She was continued on empiric IV Zosyn. Repeat CT Abd/Pelvis with PO and IV contrast showed no acute findings, no appendicitis. She had some elevation in AST, ALT after admission, bili remained wnl. Gen Surg recommended GI evaluation for possible IBS/IBD and LFT elevation. Acute hepatitis, APAP and EtoH levels wnl. CRP, Ceruloplasmin wnl. GI recommended MRCP and autoimmune hepatitis serologies. MRCP showed no acute findings. GI suspect abd pain could be due to medication vs. Viral illness vs. IBS-M. Pt diet was advanced which she was able to tolerate without worsening pain or vomiting. LFTs downtrending, Tbili and INR wnl, other labs fairly unremarkable. Urine cx with CONS, pt was transitioned to PO Augmentin to complete 7d course of abx. Pt seen and examined on day of discharge and appears stable for discharge home today. Followup with PCP within 3-5 days, GI and Hepatology asap for followup of autoimmune work up and transaminitis, amb referrals sent.

## 2025-07-18 NOTE — ASSESSMENT & PLAN NOTE
Pt still c/o nausea/ vomiting and abdominal pain.  No clear etiology of this as the appendix is normal and based on yesterdays US there is no acute gynecologic issue.  Differential includes irritable bowel - especially in a patient with chronic constipation and UGI issues.  In this patient with elevated LFTs and agus-portal inflammation I would also consider an inflammatory bowel disease (although her colonoscopy was reportedly normal).      -- no surgical intervention   -- recommend GI consult for evaluation   -- management as per primary team     Surgery signing off, please call with questions

## 2025-07-18 NOTE — ASSESSMENT & PLAN NOTE
General surgery consulted per ED and recommendations noted  Serial abdominal exams  Repeat CT abdomen with oral contrast for further evaluation  Hydrate with IV fluids  Zosyn for now, she does have UTI  PRN pain medication ordered  OK clear liquids at this time  Repeat labs in AM  Has been taking ibuprofen 800 to 1600 mg per day for chronic back/neck pain -- may need GI for EGD to rule out stomach ulcer  Start IV Pepcid BID tonight  --IBS ?  Possible stress ulcer ? (Under a lot of stress and has been taking ibuprofen 800-1600 mg daily for back/thoracic pain)

## 2025-07-18 NOTE — ASSESSMENT & PLAN NOTE
Initially constipated and had large stool with mucus noted, then afterwards has had 1-2 loose/soft yellow stools per day over past two days  Stool culture and stool for C diff ordered and pending

## 2025-07-18 NOTE — SUBJECTIVE & OBJECTIVE
Past Medical History:   Diagnosis Date    ADHD     Anxiety disorder, unspecified     Asthma     Cancer screening 11/26/2024    Divina MyRiad- MUTYH    Headache 07/17/2025    Ovarian cyst     Left ovary     Medications Ordered Prior to Encounter[1]     Past Surgical History:   Procedure Laterality Date    CARPAL TUNNEL RELEASE      CERVICAL DISC SURGERY      CHOLECYSTECTOMY      CYST REMOVAL      ENDOMETRIAL ABLATION  10/2023    EXCISIONAL HEMORRHOIDECTOMY      x 2    HERNIA REPAIR  2022    umbilical with mesh    WRIST SURGERY         Review of patient's allergies indicates:  No Known Allergies  Family History       Problem Relation (Age of Onset)    Cancer Maternal Grandmother    Colon cancer Maternal Grandfather    Diabetes Paternal Grandmother    Hypertension Maternal Grandmother, Paternal Grandmother    No Known Problems Mother, Father    Ovarian cancer Maternal Grandmother    Stroke Maternal Grandmother          Tobacco Use    Smoking status: Never    Smokeless tobacco: Never   Substance and Sexual Activity    Alcohol use: Yes     Comment: socially , rarely    Drug use: No    Sexual activity: Yes     Partners: Male     Birth control/protection: Partner-Vasectomy     Review of Systems   Constitutional:  Positive for activity change and fatigue. Negative for chills, diaphoresis and fever.   HENT:  Negative for ear pain, facial swelling, nosebleeds, rhinorrhea, sneezing, sore throat and trouble swallowing.    Eyes:  Negative for photophobia, pain and visual disturbance.   Respiratory:  Negative for apnea, cough, chest tightness, shortness of breath and wheezing.    Gastrointestinal:  Positive for abdominal pain, diarrhea, nausea and vomiting. Negative for blood in stool, constipation and rectal pain.   Endocrine: Positive for cold intolerance.   Genitourinary:  Negative for decreased urine volume, difficulty urinating, dysuria, flank pain and hematuria.   Musculoskeletal:  Negative for arthralgias, back pain, gait  problem, neck pain and neck stiffness.   Skin:  Negative for pallor and rash.   Neurological:  Positive for weakness. Negative for seizures, syncope, speech difficulty and headaches.   Hematological:  Negative for adenopathy. Does not bruise/bleed easily.   Psychiatric/Behavioral:  Negative for behavioral problems, confusion and hallucinations.      Objective:     Vital Signs (Most Recent):  Temp: 98.1 °F (36.7 °C) (07/18/25 1222)  Pulse: 69 (07/18/25 1506)  Resp: 19 (07/18/25 1222)  BP: (!) 109/59 (07/18/25 1222)  SpO2: 97 % (07/18/25 1222) Vital Signs (24h Range):  Temp:  [97.2 °F (36.2 °C)-98.5 °F (36.9 °C)] 98.1 °F (36.7 °C)  Pulse:  [56-86] 69  Resp:  [14-19] 19  SpO2:  [95 %-98 %] 97 %  BP: (101-141)/(58-82) 109/59     Weight: 59 kg (130 lb 1.1 oz) (07/17/25 1105)  Body mass index is 23.79 kg/m².      Intake/Output Summary (Last 24 hours) at 7/18/2025 1619  Last data filed at 7/18/2025 0600  Gross per 24 hour   Intake 1172.65 ml   Output --   Net 1172.65 ml       Lines/Drains/Airways       Peripheral Intravenous Line  Duration             Peripheral IV Single Lumen 07/17/25 1129 20 G Left Antecubital 1 day    Peripheral IV Single Lumen 07/18/25 0100 22 G Posterior;Right Forearm <1 day                     Physical Exam  Constitutional:       Appearance: She is well-developed.   HENT:      Head: Normocephalic and atraumatic.   Eyes:      Extraocular Movements: Extraocular movements intact.      Pupils: Pupils are equal, round, and reactive to light.   Neck:      Thyroid: No thyromegaly.   Cardiovascular:      Rate and Rhythm: Normal rate and regular rhythm.      Heart sounds: Normal heart sounds. No murmur heard.  Pulmonary:      Effort: Pulmonary effort is normal. No respiratory distress.      Breath sounds: Normal breath sounds. No wheezing or rales.   Abdominal:      General: Bowel sounds are normal. There is no distension.      Palpations: Abdomen is soft. There is no mass.      Tenderness: There is no  abdominal tenderness. There is no guarding or rebound.      Hernia: No hernia is present.   Musculoskeletal:         General: No tenderness. Normal range of motion.      Cervical back: Normal range of motion and neck supple.   Lymphadenopathy:      Cervical: No cervical adenopathy.   Skin:     General: Skin is warm and dry.      Findings: No erythema.   Neurological:      Mental Status: She is alert and oriented to person, place, and time.      Cranial Nerves: No cranial nerve deficit.      Deep Tendon Reflexes: Reflexes are normal and symmetric.   Psychiatric:         Behavior: Behavior normal.          Significant Labs:  Lab Results   Component Value Date    WBC 6.48 07/18/2025    HGB 10.9 (L) 07/18/2025    HCT 32.8 (L) 07/18/2025    MCV 91 07/18/2025     07/18/2025     BMP  Lab Results   Component Value Date     07/18/2025    K 4.0 07/18/2025     07/18/2025    CO2 25 07/18/2025    BUN 15 07/18/2025    CREATININE 0.7 07/18/2025    CALCIUM 8.4 (L) 07/18/2025    ANIONGAP 6 (L) 07/18/2025    EGFRNORACEVR >60 07/18/2025     Lab Results   Component Value Date     (H) 07/18/2025     (H) 07/18/2025    ALKPHOS 62 07/18/2025    BILITOT 0.9 07/18/2025     Lab Results   Component Value Date    HEPCAB Negative 04/02/2025     Significant Imaging:  Imaging results within the past 24 hours have been reviewed.       [1]   No current facility-administered medications on file prior to encounter.     Current Outpatient Medications on File Prior to Encounter   Medication Sig Dispense Refill    ADDERALL 10 mg Tab       albuterol (PROVENTIL/VENTOLIN HFA) 90 mcg/actuation inhaler Inhale 2 puffs into the lungs every 6 (six) hours as needed for Wheezing. Rescue 18 g 2    ergocalciferol (ERGOCALCIFEROL) 50,000 unit Cap Take 1 capsule (50,000 Units total) by mouth every 7 days. (Patient not taking: Reported on 11/26/2024) 12 capsule 0    fluticasone propionate (FLONASE) 50 mcg/actuation nasal spray 1 spray  (50 mcg total) by Each Nostril route once daily. 16 g 0    methocarbamoL (ROBAXIN) 500 MG Tab Take 1 tablet (500 mg total) by mouth 3 (three) times daily as needed. (Patient not taking: Reported on 11/26/2024) 30 tablet 0    MYDAYIS 37.5 mg CT24       prenatal 105-iron-folic ac-dha 30 mg iron- 1.4 mg-300 mg Cmpk Take by mouth. (Patient not taking: Reported on 11/26/2024)      venlafaxine (EFFEXOR-XR) 75 MG 24 hr capsule Take 75 mg by mouth. (Patient not taking: Reported on 11/26/2024)

## 2025-07-18 NOTE — CONSULTS
[facility]  Gastroenterology  Consult Note    Patient Name: Anusha Cardozo  MRN: [id number]  Admission Date: 7/17/2025  Hospital Length of Stay: 0 days  Code Status: Full Code   Attending Provider: Diana Mitchell MD   Consulting Provider: Jesse Ling MD  Primary Care Physician: Shahzad Forte MD  Principal Problem:Right lower quadrant abdominal pain    Inpatient consult to Gastroenterology  Consult performed by: Jesse Ling MD  Consult ordered by: Diana Mitchell MD  Reason for consult: Abdominal pain, n/v, diarrhea and elevated liver enzymes        Subjective:     HPI:  Ms. Cardozo is a 34-year-old female with a history of anxiety, ADHD, ovarian cysts, cholecystectomy (with persistent RUQ pain), umbilical hernia repair with mesh, and chronic constipation presents to the ER with a 2-day history of nausea, vomiting, diarrhea, and abdominal pain. She has a history of IBS-Mixed (diarrhea alternating with constipation). Pain initially began near the umbilicus and has migrated to the right flank/RLQ. She describes it as a pressure-like sensation, worsened with movement, and not cramping in nature. She has been unable to tolerate oral intake during this period. Previous colonoscopy (approx. 1 year ago) was reportedly normal. No family history of inflammatory bowel disease.    On presentation, she was afebrile with stable vital signs. Laboratory results showed normal WBC, BUN, and creatinine. Urinalysis was notable for WBCs, bacteria, and leukocyte esterase. Initial CT abdomen/pelvis revealed a distended cecum with fluid suggestive of diarrheal illness and no visualization of the appendix. There was initial concern for hemorrhagic pelvic fluid; however, transvaginal ultrasound showed only trace free fluid. Upon imaging review, findings were consistent with diarrheal fluid in the cecum and displaced bladder--not hemoperitoneum.  Interval history indicates patient remains afebrile with stable vitals  and persistent nausea and abdominal pain. Repeat CT scan showed a visualized appendix with no signs of appendicitis. No surgical intervention performed.      Past Medical History:   Diagnosis Date    ADHD     Anxiety disorder, unspecified     Asthma     Cancer screening 11/26/2024    MyRisk MyRiad- MUTYH    Headache 07/17/2025    Ovarian cyst     Left ovary     Medications Ordered Prior to Encounter[1]     Past Surgical History:   Procedure Laterality Date    CARPAL TUNNEL RELEASE      CERVICAL DISC SURGERY      CHOLECYSTECTOMY      CYST REMOVAL      ENDOMETRIAL ABLATION  10/2023    EXCISIONAL HEMORRHOIDECTOMY      x 2    HERNIA REPAIR  2022    umbilical with mesh    WRIST SURGERY         Review of patient's allergies indicates:  No Known Allergies  Family History       Problem Relation (Age of Onset)    Cancer Maternal Grandmother    Colon cancer Maternal Grandfather    Diabetes Paternal Grandmother    Hypertension Maternal Grandmother, Paternal Grandmother    No Known Problems Mother, Father    Ovarian cancer Maternal Grandmother    Stroke Maternal Grandmother          Tobacco Use    Smoking status: Never    Smokeless tobacco: Never   Substance and Sexual Activity    Alcohol use: Yes     Comment: socially , rarely    Drug use: No    Sexual activity: Yes     Partners: Male     Birth control/protection: Partner-Vasectomy     Review of Systems   Constitutional:  Positive for activity change and fatigue. Negative for chills, diaphoresis and fever.   HENT:  Negative for ear pain, facial swelling, nosebleeds, rhinorrhea, sneezing, sore throat and trouble swallowing.    Eyes:  Negative for photophobia, pain and visual disturbance.   Respiratory:  Negative for apnea, cough, chest tightness, shortness of breath and wheezing.    Gastrointestinal:  Positive for abdominal pain, diarrhea, nausea and vomiting. Negative for blood in stool, constipation and rectal pain.   Endocrine: Positive for cold intolerance.   Genitourinary:   Negative for decreased urine volume, difficulty urinating, dysuria, flank pain and hematuria.   Musculoskeletal:  Negative for arthralgias, back pain, gait problem, neck pain and neck stiffness.   Skin:  Negative for pallor and rash.   Neurological:  Positive for weakness. Negative for seizures, syncope, speech difficulty and headaches.   Hematological:  Negative for adenopathy. Does not bruise/bleed easily.   Psychiatric/Behavioral:  Negative for behavioral problems, confusion and hallucinations.      Objective:     Vital Signs (Most Recent):  Temp: 98.1 °F (36.7 °C) (07/18/25 1222)  Pulse: 69 (07/18/25 1506)  Resp: 19 (07/18/25 1222)  BP: (!) 109/59 (07/18/25 1222)  SpO2: 97 % (07/18/25 1222) Vital Signs (24h Range):  Temp:  [97.2 °F (36.2 °C)-98.5 °F (36.9 °C)] 98.1 °F (36.7 °C)  Pulse:  [56-86] 69  Resp:  [14-19] 19  SpO2:  [95 %-98 %] 97 %  BP: (101-141)/(58-82) 109/59     Weight: 59 kg (130 lb 1.1 oz) (07/17/25 1105)  Body mass index is 23.79 kg/m².      Intake/Output Summary (Last 24 hours) at 7/18/2025 1619  Last data filed at 7/18/2025 0600  Gross per 24 hour   Intake 1172.65 ml   Output --   Net 1172.65 ml       Lines/Drains/Airways       Peripheral Intravenous Line  Duration             Peripheral IV Single Lumen 07/17/25 1129 20 G Left Antecubital 1 day    Peripheral IV Single Lumen 07/18/25 0100 22 G Posterior;Right Forearm <1 day                     Physical Exam  Constitutional:       Appearance: She is well-developed.   HENT:      Head: Normocephalic and atraumatic.   Eyes:      Extraocular Movements: Extraocular movements intact.      Pupils: Pupils are equal, round, and reactive to light.   Neck:      Thyroid: No thyromegaly.   Cardiovascular:      Rate and Rhythm: Normal rate and regular rhythm.      Heart sounds: Normal heart sounds. No murmur heard.  Pulmonary:      Effort: Pulmonary effort is normal. No respiratory distress.      Breath sounds: Normal breath sounds. No wheezing or rales.    Abdominal:      General: Bowel sounds are normal. There is no distension.      Palpations: Abdomen is soft. There is no mass.      Tenderness: There is no abdominal tenderness. There is no guarding or rebound.      Hernia: No hernia is present.   Musculoskeletal:         General: No tenderness. Normal range of motion.      Cervical back: Normal range of motion and neck supple.   Lymphadenopathy:      Cervical: No cervical adenopathy.   Skin:     General: Skin is warm and dry.      Findings: No erythema.   Neurological:      Mental Status: She is alert and oriented to person, place, and time.      Cranial Nerves: No cranial nerve deficit.      Deep Tendon Reflexes: Reflexes are normal and symmetric.   Psychiatric:         Behavior: Behavior normal.          Significant Labs:  Lab Results   Component Value Date    WBC 6.48 07/18/2025    HGB 10.9 (L) 07/18/2025    HCT 32.8 (L) 07/18/2025    MCV 91 07/18/2025     07/18/2025     BMP  Lab Results   Component Value Date     07/18/2025    K 4.0 07/18/2025     07/18/2025    CO2 25 07/18/2025    BUN 15 07/18/2025    CREATININE 0.7 07/18/2025    CALCIUM 8.4 (L) 07/18/2025    ANIONGAP 6 (L) 07/18/2025    EGFRNORACEVR >60 07/18/2025     Lab Results   Component Value Date     (H) 07/18/2025     (H) 07/18/2025    ALKPHOS 62 07/18/2025    BILITOT 0.9 07/18/2025     Lab Results   Component Value Date    HEPCAB Negative 04/02/2025     Significant Imaging:  Imaging results within the past 24 hours have been reviewed.    Assessment/Plan:     GI  * Right lower quadrant abdominal pain  No acute intra-abdominal process seen on recent CT scan. Her symptoms are most likely related to some of her medications or viral illness, on top of IBS-M.     Gastrointestinal Causes  Acute Gastroenteritis (likely viral or bacterial) Given 2-day history of vomiting, diarrhea, and distended cecum with fluid.  Irritable Bowel Syndrome (IBS) Considered due to chronic  constipation, but less likely with acute symptoms and abnormal imaging.  Appendicitis Initial concern based on RLQ pain; ruled out after appendix was visualized and appeared normal on repeat CT.  Ovarian Cyst Complication (e.g., rupture or torsion) RLQ pain and trace pelvic fluid could relate to gynecologic pathology.  Abdominal Wall Pain/Post-surgical Adhesions Prior hernia repair with mesh and history of surgeries may contribute to pain exacerbated by movement.  2.  Hepatobiliary Causes (relevant to elevated liver enzymes)  Gallbladder-related disorder despite cholecystectomy Persistent RUQ pain post-cholecystectomy raises concern for biliary dyskinesia or retained stones.  Hepatitis (viral, autoimmune, or drug-induced) Elevated liver enzymes warrant consideration; may present subtly.  Nonalcoholic Fatty Liver Disease (NAFLD) Possible contributor if enzymes are chronically elevated.  Biliary obstruction or cholangitis Less likely with normal WBC, afebrile status, and no imaging suggestive of obstruction.  3. Genitourinary Causes  Urinary Tract Infection/Pyelonephritis Supported by urinalysis with WBCs, bacteria, and leukocyte esterase; flank pain could be consistent.  4. Medication or Substance-Related  Acetaminophen or other hepatotoxic drugs Could explain liver enzyme elevation if taken excessively during symptom onset.    Transaminitis  MRCP has been ordered. Pending results. I also recommend serology work up for viral and autoimmune hepatitis.     See orders.     Transaminitis  -     MRI MRCP Abdomen WO Contrast 3D WO Independent WS XPD; Standing  -     Calprotectin, Stool; Standing  -     High sensitivity CRP; Standing  -     AMA, ASMA, ceruloplasmin, A1AT, Hep A, B and C serology.     Nausea  This has resolved and patient wants to eat. I suggest advancing to clear diet if MRCP is negative.     Loose stools  Gastrointestinal Causes  Acute Gastroenteritis (likely viral or bacterial) Given 2-day history of  vomiting, diarrhea, and distended cecum with fluid.  Irritable Bowel Syndrome (IBS) Considered due to chronic constipation, but less likely with acute symptoms and abnormal imaging.  Appendicitis Initial concern based on RLQ pain; ruled out after appendix was visualized and appeared normal on repeat CT.  Ovarian Cyst Complication (e.g., rupture or torsion) RLQ pain and trace pelvic fluid could relate to gynecologic pathology.  Abdominal Wall Pain/Post-surgical Adhesions Prior hernia repair with mesh and history of surgeries may contribute to pain exacerbated by movement.  2.  Hepatobiliary Causes (relevant to elevated liver enzymes)  Gallbladder-related disorder despite cholecystectomy Persistent RUQ pain post-cholecystectomy raises concern for biliary dyskinesia or retained stones.  Hepatitis (viral, autoimmune, or drug-induced) Elevated liver enzymes warrant consideration; may present subtly.  Nonalcoholic Fatty Liver Disease (NAFLD) Possible contributor if enzymes are chronically elevated.  Biliary obstruction or cholangitis Less likely with normal WBC, afebrile status, and no imaging suggestive of obstruction.  3. Genitourinary Causes  Urinary Tract Infection/Pyelonephritis Supported by urinalysis with WBCs, bacteria, and leukocyte esterase; flank pain could be consistent.  4. Medication or Substance-Related  Acetaminophen or other hepatotoxic drugs Could explain liver enzyme elevation if taken excessively during symptom onset.    MRCP ordered.         Thank you for your consult. I will follow-up with patient. Please contact us if you have any additional questions.    Jesse Ling MD  Gastroenterology  [facility]       [1]   No current facility-administered medications on file prior to encounter.     Current Outpatient Medications on File Prior to Encounter   Medication Sig Dispense Refill    ADDERALL 10 mg Tab       albuterol (PROVENTIL/VENTOLIN HFA) 90 mcg/actuation inhaler Inhale 2 puffs into the  lungs every 6 (six) hours as needed for Wheezing. Rescue 18 g 2    ergocalciferol (ERGOCALCIFEROL) 50,000 unit Cap Take 1 capsule (50,000 Units total) by mouth every 7 days. (Patient not taking: Reported on 11/26/2024) 12 capsule 0    fluticasone propionate (FLONASE) 50 mcg/actuation nasal spray 1 spray (50 mcg total) by Each Nostril route once daily. 16 g 0    methocarbamoL (ROBAXIN) 500 MG Tab Take 1 tablet (500 mg total) by mouth 3 (three) times daily as needed. (Patient not taking: Reported on 11/26/2024) 30 tablet 0    MYDAYIS 37.5 mg CT24       prenatal 105-iron-folic ac-dha 30 mg iron- 1.4 mg-300 mg Cmpk Take by mouth. (Patient not taking: Reported on 11/26/2024)      venlafaxine (EFFEXOR-XR) 75 MG 24 hr capsule Take 75 mg by mouth. (Patient not taking: Reported on 11/26/2024)

## 2025-07-18 NOTE — ASSESSMENT & PLAN NOTE
Initially constipated and had large stool with mucus noted, then afterwards has had 1-2 loose yellow stools per day over past two days  Has not had any oral intake over past two days due to nausea and dry heaves, and abdominal pain  Stool culture and stool for C diff ordered and pending collection

## 2025-07-18 NOTE — CONSULTS
[facility]  General Surgery  Consult Note    Patient Name: Anusha Cardozo  MRN: [id number]  Code Status: No Order  Admission Date: 7/17/2025  Hospital Length of Stay: 0 days  Attending Physician: Adonis Harmon DO  Primary Care Provider: Shahzad Forte MD    Patient information was obtained from patient, past medical records, and ER records.     Consults  Subjective:     Principal Problem: <principal problem not specified>    History of Present Illness: Anusha Cardozo is a 34 y.o. female w/ hx anxiety, ADHD, and ovarian cysts who presented to the ER with a 2 day history of nausea, vomiting, diarrhea, and abdominal pain.  Pt states the pain started near her umbilicus about 2 days ago and is now located in her right flank/ RLQ.  She describes the pain as a pressure in that area, not cramping in nature.  Exacerbated by movement.  She has a history of abdominal pain and has previously had a cholecystectomy (but has persistent RUQ pain), and previous umbilical hernia repair with mesh.  She states she's been unable to keep down food or drink for 2 days.  She has a history of constipation and  had a colonoscopy last year (?) which she states was normal.  No family history of inflammatory bowel disease. On arrival to the ER she was afebrile, vitals WNL.  Labs were unremarkable- normal WBC, BUN, and Cr. Urinalysis was significant for WBC, bacteria, and leuk esterases.  CT abdomen/ pelvis showed distended cecum with fluid concerning for diarrhea illness without identification of the appendix.  There was original concern for small to moderate volume suspected hemorrhagic fluid in the pelvis.  Subsequent transvaginal ultrasound showed trace free fluid in the pelvis and on review of the previous imaging it was believed that the area initially thought to be hemoperitoneum was actually a combination of diarrheal illness (fluid in the cecum deep in the pelvis) and the displaced bladder.       No current  facility-administered medications on file prior to encounter.     Current Outpatient Medications on File Prior to Encounter   Medication Sig    ADDERALL 10 mg Tab     albuterol (PROVENTIL/VENTOLIN HFA) 90 mcg/actuation inhaler Inhale 2 puffs into the lungs every 6 (six) hours as needed for Wheezing. Rescue    ergocalciferol (ERGOCALCIFEROL) 50,000 unit Cap Take 1 capsule (50,000 Units total) by mouth every 7 days. (Patient not taking: Reported on 11/26/2024)    fluticasone propionate (FLONASE) 50 mcg/actuation nasal spray 1 spray (50 mcg total) by Each Nostril route once daily.    methocarbamoL (ROBAXIN) 500 MG Tab Take 1 tablet (500 mg total) by mouth 3 (three) times daily as needed. (Patient not taking: Reported on 11/26/2024)    MYDAYIS 37.5 mg CT24     prenatal 105-iron-folic ac-dha 30 mg iron- 1.4 mg-300 mg Cmpk Take by mouth. (Patient not taking: Reported on 11/26/2024)    venlafaxine (EFFEXOR-XR) 75 MG 24 hr capsule Take 75 mg by mouth. (Patient not taking: Reported on 11/26/2024)       Review of patient's allergies indicates:  No Known Allergies    Past Medical History:   Diagnosis Date    Cancer screening 11/26/2024    MyRisk MyRiad- MUTYH    Ovarian cyst     Left ovary     Past Surgical History:   Procedure Laterality Date    CARPAL TUNNEL RELEASE      CERVICAL DISC SURGERY      CHOLECYSTECTOMY      CYST REMOVAL      ENDOMETRIAL ABLATION  10/2023    EXCISIONAL HEMORRHOIDECTOMY      HERNIA REPAIR      WRIST SURGERY       Family History       Problem Relation (Age of Onset)    Cancer Maternal Grandmother    Colon cancer Maternal Grandfather    Diabetes Paternal Grandmother    Hypertension Paternal Grandmother, Maternal Grandmother    Ovarian cancer Maternal Grandmother    Stroke Maternal Grandmother          Tobacco Use    Smoking status: Never    Smokeless tobacco: Never   Substance and Sexual Activity    Alcohol use: Yes     Comment: socially     Drug use: No    Sexual activity: Yes     Partners: Male      Birth control/protection: Partner-Vasectomy     Review of Systems   Constitutional:  Positive for activity change and appetite change. Negative for chills and fever.   Respiratory:  Negative for chest tightness.    Cardiovascular:  Negative for chest pain.   Gastrointestinal:  Positive for abdominal pain, constipation, nausea and vomiting.     Objective:     Vital Signs (Most Recent):  Temp: 98.4 °F (36.9 °C) (07/17/25 1105)  Pulse: 86 (07/17/25 1833)  Resp: 16 (07/17/25 1833)  BP: 135/82 (07/17/25 1833)  SpO2: 95 % (07/17/25 1930) Vital Signs (24h Range):  Temp:  [98.4 °F (36.9 °C)] 98.4 °F (36.9 °C)  Pulse:  [] 86  Resp:  [14-19] 16  SpO2:  [95 %-98 %] 95 %  BP: (117-157)/(62-96) 135/82     Weight: 59 kg (130 lb 1.1 oz)  Body mass index is 23.79 kg/m².     Physical Exam  Constitutional:       General: She is not in acute distress.     Appearance: Normal appearance.   HENT:      Head: Normocephalic and atraumatic.   Eyes:      Extraocular Movements: Extraocular movements intact.      Conjunctiva/sclera: Conjunctivae normal.   Cardiovascular:      Rate and Rhythm: Normal rate and regular rhythm.   Pulmonary:      Effort: Pulmonary effort is normal.   Abdominal:      General: Abdomen is flat. There is no distension.      Palpations: Abdomen is soft. There is no mass.      Tenderness: There is no abdominal tenderness. There is no guarding or rebound.      Hernia: No hernia is present.   Musculoskeletal:         General: No swelling, tenderness, deformity or signs of injury. Normal range of motion.   Skin:     General: Skin is warm and dry.      Coloration: Skin is not jaundiced.   Neurological:      General: No focal deficit present.      Mental Status: She is alert and oriented to person, place, and time.   Psychiatric:         Mood and Affect: Mood normal.         Behavior: Behavior normal.         Thought Content: Thought content normal.            I have reviewed all pertinent lab results within the past 24  hours.  CBC:   Recent Labs   Lab 07/17/25  1129 07/17/25  1440 07/17/25  1726   WBC 8.63  --   --    RBC 4.21  --   --    HGB 13.0   < > 11.5*   HCT 37.8   < > 34.3*     --   --    MCV 90  --   --    MCH 30.9  --   --    MCHC 34.4  --   --     < > = values in this interval not displayed.     BMP:   Recent Labs   Lab 07/17/25  1129   GLU 95      K 4.0      CO2 24   BUN 15   CREATININE 0.8   CALCIUM 9.5     Recent Labs   Lab 07/17/25  1143   COLORU Yellow   SPECGRAV >=1.030*   PHUR 6.0   PROTEINUA 1+*   BACTERIA Moderate*   NITRITE Negative   LEUKOCYTESUR 1+*   UROBILINOGEN 2.0-3.0*   HYALINECASTS 0       Significant Diagnostics:  I have reviewed all pertinent imaging results/findings within the past 24 hours.  CT: I have reviewed all pertinent results/findings within the past 24 hours and my personal findings are:  chronically dilated colon consistent with her history of chronic constipation.  Colon is fluid filled consistent with diarrheal illness.  Appendix is not visualized, no inflammation around the colon to suggest appendicitis.      Assessment/Plan:     Right lower quadrant abdominal pain  Pt w/ agus-umbilical pain radiating to RLQ with 2 day history of nausea/ vomiting/ diarrhea.  On my exam she is non-tender on exam to deep palpation.  She has no leukocytosis and no clear evidence of appendicitis/ inflammation on CT scan.  I discussed with her that there are several things about her that presentation that are consistent with appendicitis but her exam, imaging, and labs are not.  I think it is reasonable to observe her overnight, and repeat her CT scan with PO contrast to see if we can see the appendix.  If we still cannot visualize the appendix and she has persistent pain it is very reasonable to remove her appendix.      -- obs to medicine  -- repeat CT scan with PO contrast  -- serial abdominal exams  -- remainder of care as per primary team       VTE Risk Mitigation (From admission,  onward)      None            Thank you for your consult. I will follow-up with patient. Please contact us if you have any additional questions.    Norma Lindquist MD  General Surgery  [facility]

## 2025-07-18 NOTE — SUBJECTIVE & OBJECTIVE
No current facility-administered medications on file prior to encounter.     Current Outpatient Medications on File Prior to Encounter   Medication Sig    ADDERALL 10 mg Tab     albuterol (PROVENTIL/VENTOLIN HFA) 90 mcg/actuation inhaler Inhale 2 puffs into the lungs every 6 (six) hours as needed for Wheezing. Rescue    ergocalciferol (ERGOCALCIFEROL) 50,000 unit Cap Take 1 capsule (50,000 Units total) by mouth every 7 days. (Patient not taking: Reported on 11/26/2024)    fluticasone propionate (FLONASE) 50 mcg/actuation nasal spray 1 spray (50 mcg total) by Each Nostril route once daily.    methocarbamoL (ROBAXIN) 500 MG Tab Take 1 tablet (500 mg total) by mouth 3 (three) times daily as needed. (Patient not taking: Reported on 11/26/2024)    MYDAYIS 37.5 mg CT24     prenatal 105-iron-folic ac-dha 30 mg iron- 1.4 mg-300 mg Cmpk Take by mouth. (Patient not taking: Reported on 11/26/2024)    venlafaxine (EFFEXOR-XR) 75 MG 24 hr capsule Take 75 mg by mouth. (Patient not taking: Reported on 11/26/2024)       Review of patient's allergies indicates:  No Known Allergies    Past Medical History:   Diagnosis Date    Cancer screening 11/26/2024    MyRisk MyRiad- MUTYH    Ovarian cyst     Left ovary     Past Surgical History:   Procedure Laterality Date    CARPAL TUNNEL RELEASE      CERVICAL DISC SURGERY      CHOLECYSTECTOMY      CYST REMOVAL      ENDOMETRIAL ABLATION  10/2023    EXCISIONAL HEMORRHOIDECTOMY      HERNIA REPAIR      WRIST SURGERY       Family History       Problem Relation (Age of Onset)    Cancer Maternal Grandmother    Colon cancer Maternal Grandfather    Diabetes Paternal Grandmother    Hypertension Paternal Grandmother, Maternal Grandmother    Ovarian cancer Maternal Grandmother    Stroke Maternal Grandmother          Tobacco Use    Smoking status: Never    Smokeless tobacco: Never   Substance and Sexual Activity    Alcohol use: Yes     Comment: socially     Drug use: No    Sexual activity: Yes      Partners: Male     Birth control/protection: Partner-Vasectomy     Review of Systems   Constitutional:  Positive for activity change and appetite change. Negative for chills and fever.   Respiratory:  Negative for chest tightness.    Cardiovascular:  Negative for chest pain.   Gastrointestinal:  Positive for abdominal pain, constipation, nausea and vomiting.     Objective:     Vital Signs (Most Recent):  Temp: 98.4 °F (36.9 °C) (07/17/25 1105)  Pulse: 86 (07/17/25 1833)  Resp: 16 (07/17/25 1833)  BP: 135/82 (07/17/25 1833)  SpO2: 95 % (07/17/25 1930) Vital Signs (24h Range):  Temp:  [98.4 °F (36.9 °C)] 98.4 °F (36.9 °C)  Pulse:  [] 86  Resp:  [14-19] 16  SpO2:  [95 %-98 %] 95 %  BP: (117-157)/(62-96) 135/82     Weight: 59 kg (130 lb 1.1 oz)  Body mass index is 23.79 kg/m².     Physical Exam  Constitutional:       General: She is not in acute distress.     Appearance: Normal appearance.   HENT:      Head: Normocephalic and atraumatic.   Eyes:      Extraocular Movements: Extraocular movements intact.      Conjunctiva/sclera: Conjunctivae normal.   Cardiovascular:      Rate and Rhythm: Normal rate and regular rhythm.   Pulmonary:      Effort: Pulmonary effort is normal.   Abdominal:      General: Abdomen is flat. There is no distension.      Palpations: Abdomen is soft. There is no mass.      Tenderness: There is no abdominal tenderness. There is no guarding or rebound.      Hernia: No hernia is present.   Musculoskeletal:         General: No swelling, tenderness, deformity or signs of injury. Normal range of motion.   Skin:     General: Skin is warm and dry.      Coloration: Skin is not jaundiced.   Neurological:      General: No focal deficit present.      Mental Status: She is alert and oriented to person, place, and time.   Psychiatric:         Mood and Affect: Mood normal.         Behavior: Behavior normal.         Thought Content: Thought content normal.            I have reviewed all pertinent lab results  within the past 24 hours.  CBC:   Recent Labs   Lab 07/17/25  1129 07/17/25  1440 07/17/25  1726   WBC 8.63  --   --    RBC 4.21  --   --    HGB 13.0   < > 11.5*   HCT 37.8   < > 34.3*     --   --    MCV 90  --   --    MCH 30.9  --   --    MCHC 34.4  --   --     < > = values in this interval not displayed.     BMP:   Recent Labs   Lab 07/17/25  1129   GLU 95      K 4.0      CO2 24   BUN 15   CREATININE 0.8   CALCIUM 9.5     Recent Labs   Lab 07/17/25  1143   COLORU Yellow   SPECGRAV >=1.030*   PHUR 6.0   PROTEINUA 1+*   BACTERIA Moderate*   NITRITE Negative   LEUKOCYTESUR 1+*   UROBILINOGEN 2.0-3.0*   HYALINECASTS 0       Significant Diagnostics:  I have reviewed all pertinent imaging results/findings within the past 24 hours.  CT: I have reviewed all pertinent results/findings within the past 24 hours and my personal findings are:  chronically dilated colon consistent with her history of chronic constipation.  Colon is fluid filled consistent with diarrheal illness.  Appendix is not visualized, no inflammation around the colon to suggest appendicitis.

## 2025-07-18 NOTE — HPI
Ms. Cardozo is a 34-year-old female with a history of anxiety, ADHD, ovarian cysts, cholecystectomy (with persistent RUQ pain), umbilical hernia repair with mesh, and chronic constipation presents to the ER with a 2-day history of nausea, vomiting, diarrhea, and abdominal pain. She has a history of IBS-Mixed (diarrhea alternating with constipation). Pain initially began near the umbilicus and has migrated to the right flank/RLQ. She describes it as a pressure-like sensation, worsened with movement, and not cramping in nature. She has been unable to tolerate oral intake during this period. Previous colonoscopy (approx. 1 year ago) was reportedly normal. No family history of inflammatory bowel disease.    On presentation, she was afebrile with stable vital signs. Laboratory results showed normal WBC, BUN, and creatinine. Urinalysis was notable for WBCs, bacteria, and leukocyte esterase. Initial CT abdomen/pelvis revealed a distended cecum with fluid suggestive of diarrheal illness and no visualization of the appendix. There was initial concern for hemorrhagic pelvic fluid; however, transvaginal ultrasound showed only trace free fluid. Upon imaging review, findings were consistent with diarrheal fluid in the cecum and displaced bladder--not hemoperitoneum.  Interval history indicates patient remains afebrile with stable vitals and persistent nausea and abdominal pain. Repeat CT scan showed a visualized appendix with no signs of appendicitis. No surgical intervention performed.

## 2025-07-18 NOTE — ASSESSMENT & PLAN NOTE
- mild elevation in LFTs since admission, pt reports cholecystectomy in 2022  - CT shows mild periportal edema  - will followup repeat CT imaging   -Obtain tylenol, etoh, acute hepatitis serologies  - monitor CMP daily

## 2025-07-18 NOTE — ASSESSMENT & PLAN NOTE
PRN antiemetics  Try clear liquid diet tonight  Will keep NPO x medications after midnight   Repeat CT scan with oral contrast in AM

## 2025-07-18 NOTE — ASSESSMENT & PLAN NOTE
No acute intra-abdominal process seen on recent CT scan. Her symptoms are most likely related to some of her medications or viral illness, on top of IBS-M.     Gastrointestinal Causes  Acute Gastroenteritis (likely viral or bacterial) Given 2-day history of vomiting, diarrhea, and distended cecum with fluid.  Irritable Bowel Syndrome (IBS) Considered due to chronic constipation, but less likely with acute symptoms and abnormal imaging.  Appendicitis Initial concern based on RLQ pain; ruled out after appendix was visualized and appeared normal on repeat CT.  Ovarian Cyst Complication (e.g., rupture or torsion) RLQ pain and trace pelvic fluid could relate to gynecologic pathology.  Abdominal Wall Pain/Post-surgical Adhesions Prior hernia repair with mesh and history of surgeries may contribute to pain exacerbated by movement.  2.  Hepatobiliary Causes (relevant to elevated liver enzymes)  Gallbladder-related disorder despite cholecystectomy Persistent RUQ pain post-cholecystectomy raises concern for biliary dyskinesia or retained stones.  Hepatitis (viral, autoimmune, or drug-induced) Elevated liver enzymes warrant consideration; may present subtly.  Nonalcoholic Fatty Liver Disease (NAFLD) Possible contributor if enzymes are chronically elevated.  Biliary obstruction or cholangitis Less likely with normal WBC, afebrile status, and no imaging suggestive of obstruction.  3. Genitourinary Causes  Urinary Tract Infection/Pyelonephritis Supported by urinalysis with WBCs, bacteria, and leukocyte esterase; flank pain could be consistent.  4. Medication or Substance-Related  Acetaminophen or other hepatotoxic drugs Could explain liver enzyme elevation if taken excessively during symptom onset.

## 2025-07-18 NOTE — PROGRESS NOTES
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  She complains of right lower quadrant and right flank pain which started 2-3 days ago and has progressively gotten worse.  She also reports 2 days of nausea, vomiting, dry heaving.  She shows a picture of formed stool with some mucus noted which she had 2 days ago, does report loose yellow colored mucus like stool 1 to 2 times a day for the past 2 days.  Denies fever, chills, dizziness, lightheadedness, chest pain, shortness of breath, edema, dysuria.  She does report urinating small amounts more frequently than usual.  She states that her pain is severe and constant, exacerbated with movement.  She reports that she previously had cholecystectomy and umbilical hernia repair with mesh.  She states that she has been taking ibuprofen 800-1600 mg per day for chronic neck and back pain.  She denies any previous history of peptic ulcer disease, states that she has never had EGD in the past.  She has been unable to keep any food or fluids down for past 2 days.  She does have history of constipation in the past.  She denies any history of inflammatory bowel disease, reports that she had a colonoscopy sometime last year.  While in ED, afebrile, vital signs stable on room air.  Lab workup insignificant aside from UTI with   Cloudy  yellow urine, negative nitrites, 1+ leukocyte esterase, 30 WBCs, moderate bacteria.  Urine culture in progress.   Urine hCG negative.  EKG-- normal sinus rhythm, heart rate 95, .  CT abdomen and pelvis with IV contrast /no oral contrast was done which showed small-to-moderate volume suspected hemorrhagic fluid in pelvis /pelvic ultrasound recommended; possible diarrheal illness, periportal edema, surgically absent gallbladder, no evidence of dilated bile ducts, no pancreatic mass or peripancreatic fat stranding, no hydronephrosis or obstructive uropathy, no evidence of bladder wall thickening, bilateral ovarian cysts and follicles noted with small-to-moderate volume suspected hemorrhagic fluid in pelvis, no ascites, no free air, appendix was not identified.  Transvaginal ultrasound was done which showed trace free fluid in pelvis, no hemoperitoneum-- radiology interpretation states that previous findings on CT likely represent combination of diarrheal illness with fluid in the cecum deep in the pelvis and displaced bladder.  General surgery was consulted and patient evaluated by Dr. Norma Lindquist, who recommended observation overnight and to repeat CT abdomen with oral contrast, plan for possible appendectomy if still cannot visualize appendix and/or having persistent abdominal pain. While in ED, she was given IV morphine 4 mg, IV Zofran, IV Zosyn IV dilaudid x 2 doses (1 mg each), IV toradol, NS 1L bolus, and  LR 1L bolus. Hospital Medicine consulted for admission due to abdominal pain.    Overview/Hospital Course:  Repeat CT abd with PO/IV contrast pending. Continued on empiric IV Zosyn     Interval History: PT admitted overnight. Seen and examined, no family at bedside during rounds. PT reports continued RLQ pain associated with nausea and dry heaving. Pain worsened and radiated to midline after receiving IV morphine. Somewhat alleviated with IV dilaudid. Denies diarrhea, emesis.     Review of  "Systems  Objective:     Vital Signs (Most Recent):  Temp: 97.8 °F (36.6 °C) (07/18/25 0755)  Pulse: 74 (07/18/25 0755)  Resp: 16 (07/18/25 0852)  BP: 125/75 (07/18/25 0755)  SpO2: 98 % (07/18/25 0755) Vital Signs (24h Range):  Temp:  [97.2 °F (36.2 °C)-98.5 °F (36.9 °C)] 97.8 °F (36.6 °C)  Pulse:  [] 74  Resp:  [14-19] 16  SpO2:  [95 %-98 %] 98 %  BP: (101-157)/(58-96) 125/75     Weight: 59 kg (130 lb 1.1 oz)  Body mass index is 23.79 kg/m².    Intake/Output Summary (Last 24 hours) at 7/18/2025 0858  Last data filed at 7/18/2025 0600  Gross per 24 hour   Intake 2258.54 ml   Output --   Net 2258.54 ml         Physical Exam  Vitals and nursing note reviewed.   Constitutional:       General: She is not in acute distress.     Appearance: She is not ill-appearing.   HENT:      Head: Normocephalic.      Mouth/Throat:      Mouth: Mucous membranes are dry.      Pharynx: Oropharynx is clear.   Cardiovascular:      Rate and Rhythm: Normal rate and regular rhythm.      Heart sounds: No murmur heard.  Pulmonary:      Effort: Pulmonary effort is normal.      Breath sounds: Normal breath sounds. No wheezing or rales.      Comments: On room air   Abdominal:      General: There is no distension.      Palpations: Abdomen is soft.      Tenderness: There is abdominal tenderness (RLQ TTP). There is no guarding or rebound.   Musculoskeletal:      Right lower leg: No edema.      Left lower leg: No edema.   Neurological:      Mental Status: She is alert.      Comments: Answers questions appropriately                Significant Labs: All pertinent labs within the past 24 hours have been reviewed.    Significant Imaging: I have reviewed all pertinent imaging results/findings within the past 24 hours.      Assessment & Plan  Right lower quadrant abdominal pain  Initial CT abd with "small to moderate volume suspected hemorrhagic fluid in pelvis, possible diarrheal illness, nonspecific periportal edema" Subsequent pelvic ultrasound " "showed "trace free fluid in pelvis, no hemoperitoneum demonstrated"   General surgery consulted per ED  and recommended serial abd exams and repeat CT abd with PO and IV contrast which is pending   Continue IV fluids pending CT/NPO status   Continue empiric IV Zosyn for UTI   PRN pain medication ordered  Has been taking ibuprofen 800 to 1600 mg per day for chronic back/neck pain  - Continue IV Pepcid      Transaminitis  - mild elevation in LFTs since admission, pt reports cholecystectomy in 2022  - CT shows mild periportal edema  - will followup repeat CT imaging   -Obtain tylenol, etoh, acute hepatitis serologies  - monitor CMP daily     Acute cystitis  Continue IV Zosyn    Urine culture pending     Loose stools  Initially constipated and had large stool with mucus noted, then afterwards has had 1-2 loose/soft yellow stools per day over past two days  Stool culture and stool for C diff ordered and pending     Anxiety  Over past few months RX noted for Atarax for anxiety, used to take Effexor  Increased stress -- 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

## 2025-07-18 NOTE — ASSESSMENT & PLAN NOTE
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

## 2025-07-18 NOTE — SUBJECTIVE & OBJECTIVE
Interval History: AFVSS.  Still c/o nausea and abdominal pain.  Repeat CT scan with appendix visualized and no evidence of appendicitis.      Medications:  Continuous Infusions:   lactated ringers   Intravenous Continuous 100 mL/hr at 07/18/25 0926 New Bag at 07/18/25 0926     Scheduled Meds:   famotidine  20 mg Intravenous BID    fluticasone propionate  1 spray Each Nostril Daily    piperacillin-tazobactam (Zosyn) IV (PEDS and ADULTS) (extended infusion is not appropriate)  4.5 g Intravenous Q8H     PRN Meds:  Current Facility-Administered Medications:     dextrose 50%, 12.5 g, Intravenous, PRN    dextrose 50%, 25 g, Intravenous, PRN    glucagon (human recombinant), 1 mg, Intramuscular, PRN    glucose, 16 g, Oral, PRN    glucose, 24 g, Oral, PRN    HYDROcodone-acetaminophen, 1 tablet, Oral, Q6H PRN    HYDROmorphone, 0.5 mg, Intravenous, Q6H PRN    hydrOXYzine pamoate, 50 mg, Oral, Q8H PRN    naloxone, 0.02 mg, Intravenous, PRN    ondansetron, 4 mg, Intravenous, Q6H PRN    promethazine, 25 mg, Oral, Q6H PRN    sodium chloride 0.9%, 3 mL, Intravenous, Q8H PRN     Review of patient's allergies indicates:  No Known Allergies  Objective:     Vital Signs (Most Recent):  Temp: 97.8 °F (36.6 °C) (07/18/25 0755)  Pulse: 74 (07/18/25 0755)  Resp: 16 (07/18/25 0852)  BP: 125/75 (07/18/25 0755)  SpO2: 98 % (07/18/25 0755) Vital Signs (24h Range):  Temp:  [97.2 °F (36.2 °C)-98.5 °F (36.9 °C)] 97.8 °F (36.6 °C)  Pulse:  [] 74  Resp:  [14-19] 16  SpO2:  [95 %-98 %] 98 %  BP: (101-157)/(58-96) 125/75     Weight: 59 kg (130 lb 1.1 oz)  Body mass index is 23.79 kg/m².    Intake/Output - Last 3 Shifts         07/16 0700 07/17 0659 07/17 0700 07/18 0659 07/18 0700 07/19 0659    I.V. (mL/kg)  1046.2 (17.7)     IV Piggyback  1212.3     Total Intake(mL/kg)  2258.5 (38.3)     Net  +2258.5                     Physical Exam  Constitutional:       General: She is not in acute distress.     Appearance: Normal appearance.   HENT:       Head: Normocephalic and atraumatic.   Eyes:      Extraocular Movements: Extraocular movements intact.      Conjunctiva/sclera: Conjunctivae normal.   Cardiovascular:      Rate and Rhythm: Normal rate and regular rhythm.   Pulmonary:      Effort: Pulmonary effort is normal.   Abdominal:      General: Abdomen is flat. There is no distension.      Palpations: Abdomen is soft. There is no mass.      Tenderness: There is no abdominal tenderness. There is no guarding or rebound.      Hernia: No hernia is present.   Musculoskeletal:         General: No swelling, tenderness, deformity or signs of injury. Normal range of motion.   Skin:     General: Skin is warm and dry.      Coloration: Skin is not jaundiced.   Neurological:      General: No focal deficit present.      Mental Status: She is alert and oriented to person, place, and time.   Psychiatric:         Mood and Affect: Mood normal.         Behavior: Behavior normal.         Thought Content: Thought content normal.          Significant Labs:  I have reviewed all pertinent lab results within the past 24 hours.  CBC:   Recent Labs   Lab 07/18/25  0357   WBC 6.48   RBC 3.62*   HGB 10.9*   HCT 32.8*      MCV 91   MCH 30.1   MCHC 33.2     BMP:   Recent Labs   Lab 07/18/25  0357   GLU 86      K 4.0      CO2 25   BUN 15   CREATININE 0.7   CALCIUM 8.4*       Significant Diagnostics:  I have reviewed all pertinent imaging results/findings within the past 24 hours.  CT: I have reviewed all pertinent results/findings within the past 24 hours and my personal findings are:  appendix visualized and fills with contrast, no evidence of appendicitis.

## 2025-07-18 NOTE — SUBJECTIVE & OBJECTIVE
Past Medical History:   Diagnosis Date    ADHD     Anxiety disorder, unspecified     Asthma     Cancer screening 11/26/2024    Divina MyRiad- MUTYH    Ovarian cyst     Left ovary       Past Surgical History:   Procedure Laterality Date    CARPAL TUNNEL RELEASE      CERVICAL DISC SURGERY      CHOLECYSTECTOMY      CYST REMOVAL      ENDOMETRIAL ABLATION  10/2023    EXCISIONAL HEMORRHOIDECTOMY      x 2    HERNIA REPAIR  2022    umbilical with mesh    WRIST SURGERY         Review of patient's allergies indicates:  No Known Allergies    No current facility-administered medications on file prior to encounter.     Current Outpatient Medications on File Prior to Encounter   Medication Sig    ADDERALL 10 mg Tab     albuterol (PROVENTIL/VENTOLIN HFA) 90 mcg/actuation inhaler Inhale 2 puffs into the lungs every 6 (six) hours as needed for Wheezing. Rescue    ergocalciferol (ERGOCALCIFEROL) 50,000 unit Cap Take 1 capsule (50,000 Units total) by mouth every 7 days. (Patient not taking: Reported on 11/26/2024)    fluticasone propionate (FLONASE) 50 mcg/actuation nasal spray 1 spray (50 mcg total) by Each Nostril route once daily.    methocarbamoL (ROBAXIN) 500 MG Tab Take 1 tablet (500 mg total) by mouth 3 (three) times daily as needed. (Patient not taking: Reported on 11/26/2024)    MYDAYIS 37.5 mg CT24     prenatal 105-iron-folic ac-dha 30 mg iron- 1.4 mg-300 mg Cmpk Take by mouth. (Patient not taking: Reported on 11/26/2024)    venlafaxine (EFFEXOR-XR) 75 MG 24 hr capsule Take 75 mg by mouth. (Patient not taking: Reported on 11/26/2024)     Family History       Problem Relation (Age of Onset)    Cancer Maternal Grandmother    Colon cancer Maternal Grandfather    Diabetes Paternal Grandmother    Hypertension Maternal Grandmother, Paternal Grandmother    No Known Problems Mother, Father    Ovarian cancer Maternal Grandmother    Stroke Maternal Grandmother          Tobacco Use    Smoking status: Never    Smokeless tobacco: Never    Substance and Sexual Activity    Alcohol use: Yes     Comment: socially , rarely    Drug use: No    Sexual activity: Yes     Partners: Male     Birth control/protection: Partner-Vasectomy     Review of Systems   Constitutional:  Positive for appetite change. Negative for chills, diaphoresis, fatigue and fever.        Decreased po intake past two days   HENT: Negative.  Negative for congestion, sinus pressure, sore throat and trouble swallowing.    Eyes: Negative.    Respiratory: Negative.  Negative for cough, chest tightness, shortness of breath and wheezing.    Cardiovascular: Negative.  Negative for chest pain, palpitations and leg swelling.   Gastrointestinal:  Positive for abdominal pain, nausea and vomiting. Negative for abdominal distention and blood in stool.        1-2 loose stools per day past two days, was constipated prior to that  Mucus noted in stool  Dry heaves x 2 days   Endocrine: Negative.    Genitourinary:  Positive for decreased urine volume, flank pain, frequency and urgency. Negative for difficulty urinating, dysuria, hematuria and pelvic pain.        Urinating more frequently, smaller amounts than usual  Mild right flank pain   Musculoskeletal:  Negative for arthralgias, back pain, myalgias and neck pain.   Skin: Negative.    Allergic/Immunologic: Negative.    Neurological:  Positive for headaches. Negative for dizziness, seizures, syncope, facial asymmetry, speech difficulty, weakness and light-headedness.   Psychiatric/Behavioral: Negative.     All other systems reviewed and are negative.    Objective:     Vital Signs (Most Recent):  Temp: 97.7 °F (36.5 °C) (07/17/25 1959)  Pulse: 83 (07/17/25 1959)  Resp: 18 (07/17/25 1959)  BP: 119/71 (07/17/25 1959)  SpO2: 97 % (07/17/25 1959) Vital Signs (24h Range):  Temp:  [97.7 °F (36.5 °C)-98.4 °F (36.9 °C)] 97.7 °F (36.5 °C)  Pulse:  [] 83  Resp:  [14-19] 18  SpO2:  [95 %-98 %] 97 %  BP: (117-157)/(62-96) 119/71     Weight: 59 kg (130 lb 1.1  oz)  Body mass index is 23.79 kg/m².     Physical Exam  Vitals reviewed.   Constitutional:       General: She is not in acute distress.     Appearance: She is ill-appearing. She is not toxic-appearing or diaphoretic.   HENT:      Head: Normocephalic and atraumatic.      Nose: Nose normal.      Mouth/Throat:      Mouth: Mucous membranes are moist.      Pharynx: Oropharynx is clear.   Eyes:      Extraocular Movements: Extraocular movements intact.      Pupils: Pupils are equal, round, and reactive to light.   Cardiovascular:      Rate and Rhythm: Normal rate and regular rhythm.      Pulses: Normal pulses.      Heart sounds: Normal heart sounds.   Pulmonary:      Effort: Pulmonary effort is normal. No respiratory distress.      Breath sounds: Normal breath sounds. No stridor. No wheezing, rhonchi or rales.   Chest:      Chest wall: No tenderness.   Abdominal:      General: Bowel sounds are normal. There is no distension.      Palpations: Abdomen is soft. There is no mass.      Tenderness: There is abdominal tenderness. There is no right CVA tenderness, left CVA tenderness, guarding or rebound.      Hernia: No hernia is present.      Comments: Mild TTP RLQ   Musculoskeletal:         General: Normal range of motion.      Cervical back: Neck supple.      Right lower leg: No edema.      Left lower leg: No edema.   Skin:     General: Skin is warm and dry.      Capillary Refill: Capillary refill takes less than 2 seconds.   Neurological:      General: No focal deficit present.      Mental Status: She is alert and oriented to person, place, and time.      Motor: No weakness.   Psychiatric:         Mood and Affect: Mood normal.         Behavior: Behavior normal.         Thought Content: Thought content normal.              CRANIAL NERVES     CN III, IV, VI   Pupils are equal, round, and reactive to light.       Significant Labs: All pertinent labs within the past 24 hours have been reviewed.    CBC:   Recent Labs   Lab  "07/17/25  1129 07/17/25  1440 07/17/25  1726   WBC 8.63  --   --    HGB 13.0 11.8* 11.5*   HCT 37.8 34.8* 34.3*     --   --      CMP:   Recent Labs   Lab 07/17/25  1129      K 4.0      CO2 24   GLU 95   BUN 15   CREATININE 0.8   CALCIUM 9.5   PROT 7.5   ALBUMIN 4.3   BILITOT 0.8   ALKPHOS 51   AST 13   ALT 11   ANIONGAP 8     Lactic Acid: No results for input(s): "LACTATE" in the last 48 hours.  Lipase:   Recent Labs   Lab 07/17/25  1129   LIPASE 13     Urine Studies:   Recent Labs   Lab 07/17/25  1143   COLORU Yellow   APPEARANCEUA Cloudy*   PHUR 6.0   SPECGRAV >=1.030*   PROTEINUA 1+*   GLUCUA Negative   BILIRUBINUA Negative   OCCULTUA Trace*   NITRITE Negative   UROBILINOGEN 2.0-3.0*   LEUKOCYTESUR 1+*   RBCUA 2   WBCUA 30*   BACTERIA Moderate*   HYALINECASTS 0     Urine culture in progress    hCG urine negative    EKG - NSR    Significant Imaging: I have reviewed all pertinent imaging results/findings within the past 24 hours.    CT abdomen/pelvis with IV contrast only: colon is fluid filled, consistent with diarrheal illness, appendix not visualized, chronically dilated colon consistent with history of chronic constipation, small to moderate volume suspected hemorrhagic fluid in pelvis--recommend correlation with pelvic ultrasound    Ultrasound pelvis, transvag (non OB)--trace free fluid in pelvis, no hemoperitoneum demonstrated, radiology notes that previous findings probably represent combination of diarrheal illness with fluid in cecum deep in the pelvis and displaced bladder  "

## 2025-07-18 NOTE — PLAN OF CARE
[facility]  Discharge Assessment    Primary Care Provider: Shahzad Forte MD     Discharge Assessment (most recent)       BRIEF DISCHARGE ASSESSMENT - 07/18/25 1021          Discharge Planning    Assessment Type Discharge Planning Brief Assessment     Resource/Environmental Concerns none     Support Systems Spouse/significant other;Parent     Equipment Currently Used at Home none     Current Living Arrangements home     Patient/Family Anticipates Transition to home     Patient/Family Anticipated Services at Transition none     DME Needed Upon Discharge  none     Discharge Plan A Home

## 2025-07-18 NOTE — HPI
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  She complains of right lower quadrant and right flank pain which started 2-3 days ago and has progressively gotten worse.  She also reports 2 days of nausea, vomiting, dry heaving.  She shows a picture of formed stool with some mucus noted which she had 2 days ago, does report loose yellow colored mucus like stool 1 to 2 times a day for the past 2 days.  Denies fever, chills, dizziness, lightheadedness, chest pain, shortness of breath, edema, dysuria.  She does report urinating small amounts more frequently than usual.  She states that her pain is severe and constant, exacerbated with movement.  She reports that she previously had cholecystectomy and umbilical hernia repair with mesh.  She states that she has been taking ibuprofen 800-1600 mg per day for chronic neck and back pain.  She denies any previous history of peptic ulcer disease, states that she has never had EGD in the past.  She has been unable to keep any food or fluids down for past 2 days.  She does have history of constipation in the past.  She denies any history of inflammatory bowel disease, reports that she had a colonoscopy sometime last year.  While in ED, afebrile, vital signs stable on room air.  Lab workup insignificant aside from UTI with   Cloudy yellow urine, negative nitrites, 1+ leukocyte esterase, 30 WBCs, moderate bacteria.  Urine culture in progress.   Urine hCG negative.  EKG-- normal sinus rhythm, heart rate 95, .  CT abdomen and pelvis with IV contrast /no oral contrast was done which showed small-to-moderate volume suspected hemorrhagic fluid in pelvis /pelvic ultrasound recommended; possible  diarrheal illness, periportal edema, surgically absent gallbladder, no evidence of dilated bile ducts, no pancreatic mass or peripancreatic fat stranding, no hydronephrosis or obstructive uropathy, no evidence of bladder wall thickening, bilateral ovarian cysts and follicles noted with small-to-moderate volume suspected hemorrhagic fluid in pelvis, no ascites, no free air, appendix was not identified.  Transvaginal ultrasound was done which showed trace free fluid in pelvis, no hemoperitoneum-- radiology interpretation states that previous findings on CT likely represent combination of diarrheal illness with fluid in the cecum deep in the pelvis and displaced bladder.  General surgery was consulted and patient evaluated by Dr. Norma Lindquist, who recommended observation overnight and to repeat CT abdomen with oral contrast, plan for possible appendectomy if still cannot visualize appendix and/or having persistent abdominal pain. While in ED, she was given IV morphine 4 mg, IV Zofran, IV Zosyn IV dilaudid x 2 doses (1 mg each), IV toradol, NS 1L bolus, and  LR 1L bolus. Hospital Medicine consulted for admission due to abdominal pain.

## 2025-07-19 VITALS
OXYGEN SATURATION: 98 % | HEIGHT: 62 IN | SYSTOLIC BLOOD PRESSURE: 100 MMHG | TEMPERATURE: 99 F | DIASTOLIC BLOOD PRESSURE: 54 MMHG | HEART RATE: 85 BPM | RESPIRATION RATE: 19 BRPM | BODY MASS INDEX: 23.93 KG/M2 | WEIGHT: 130.06 LBS

## 2025-07-19 LAB
ABSOLUTE EOSINOPHIL (OHS): 0.1 K/UL
ABSOLUTE MONOCYTE (OHS): 0.38 K/UL (ref 0.3–1)
ABSOLUTE NEUTROPHIL COUNT (OHS): 1.66 K/UL (ref 1.8–7.7)
ALBUMIN SERPL BCP-MCNC: 3 G/DL (ref 3.5–5.2)
ALP SERPL-CCNC: 50 UNIT/L (ref 40–150)
ALT SERPL W/O P-5'-P-CCNC: 263 UNIT/L (ref 10–44)
ANION GAP (OHS): 8 MMOL/L (ref 8–16)
AST SERPL-CCNC: 92 UNIT/L (ref 11–45)
BACTERIA UR CULT: ABNORMAL
BASOPHILS # BLD AUTO: 0.03 K/UL
BASOPHILS NFR BLD AUTO: 0.5 %
BILIRUB SERPL-MCNC: 0.6 MG/DL (ref 0.1–1)
BUN SERPL-MCNC: 13 MG/DL (ref 6–20)
CALCIUM SERPL-MCNC: 8.1 MG/DL (ref 8.7–10.5)
CERULOPLASMIN SERPL-MCNC: 20 MG/DL (ref 15–45)
CHLORIDE SERPL-SCNC: 106 MMOL/L (ref 95–110)
CO2 SERPL-SCNC: 22 MMOL/L (ref 23–29)
CREAT SERPL-MCNC: 0.7 MG/DL (ref 0.5–1.4)
ERYTHROCYTE [DISTWIDTH] IN BLOOD BY AUTOMATED COUNT: 10.9 % (ref 11.5–14.5)
GFR SERPLBLD CREATININE-BSD FMLA CKD-EPI: >60 ML/MIN/1.73/M2
GLUCOSE SERPL-MCNC: 67 MG/DL (ref 70–110)
HCT VFR BLD AUTO: 29.2 % (ref 37–48.5)
HGB BLD-MCNC: 10.1 GM/DL (ref 12–16)
IMM GRANULOCYTES # BLD AUTO: 0.01 K/UL (ref 0–0.04)
IMM GRANULOCYTES NFR BLD AUTO: 0.2 % (ref 0–0.5)
INR PPP: 1.1 (ref 0.8–1.2)
IRON SATN MFR SERPL: 37 % (ref 20–50)
IRON SERPL-MCNC: 89 UG/DL (ref 30–160)
LYMPHOCYTES # BLD AUTO: 3.31 K/UL (ref 1–4.8)
MCH RBC QN AUTO: 30.8 PG (ref 27–31)
MCHC RBC AUTO-ENTMCNC: 34.6 G/DL (ref 32–36)
MCV RBC AUTO: 89 FL (ref 82–98)
NUCLEATED RBC (/100WBC) (OHS): 0 /100 WBC
PLATELET # BLD AUTO: 200 K/UL (ref 150–450)
PMV BLD AUTO: 9.8 FL (ref 9.2–12.9)
POCT GLUCOSE: 63 MG/DL (ref 70–110)
POCT GLUCOSE: 86 MG/DL (ref 70–110)
POTASSIUM SERPL-SCNC: 3.7 MMOL/L (ref 3.5–5.1)
PROT SERPL-MCNC: 5.2 GM/DL (ref 6–8.4)
PROTHROMBIN TIME: 12.6 SECONDS (ref 9–12.5)
RBC # BLD AUTO: 3.28 M/UL (ref 4–5.4)
RELATIVE EOSINOPHIL (OHS): 1.8 %
RELATIVE LYMPHOCYTE (OHS): 60.3 % (ref 18–48)
RELATIVE MONOCYTE (OHS): 6.9 % (ref 4–15)
RELATIVE NEUTROPHIL (OHS): 30.3 % (ref 38–73)
SODIUM SERPL-SCNC: 136 MMOL/L (ref 136–145)
TIBC SERPL-MCNC: 241 UG/DL (ref 250–450)
TRANSFERRIN SERPL-MCNC: 163 MG/DL (ref 200–375)
WBC # BLD AUTO: 5.49 K/UL (ref 3.9–12.7)

## 2025-07-19 PROCEDURE — 80053 COMPREHEN METABOLIC PANEL: CPT | Performed by: NURSE PRACTITIONER

## 2025-07-19 PROCEDURE — 85610 PROTHROMBIN TIME: CPT | Performed by: INTERNAL MEDICINE

## 2025-07-19 PROCEDURE — 25000003 PHARM REV CODE 250: Performed by: INTERNAL MEDICINE

## 2025-07-19 PROCEDURE — G0378 HOSPITAL OBSERVATION PER HR: HCPCS

## 2025-07-19 PROCEDURE — 96366 THER/PROPH/DIAG IV INF ADDON: CPT

## 2025-07-19 PROCEDURE — 99900035 HC TECH TIME PER 15 MIN (STAT)

## 2025-07-19 PROCEDURE — 36415 COLL VENOUS BLD VENIPUNCTURE: CPT | Performed by: NURSE PRACTITIONER

## 2025-07-19 PROCEDURE — 96368 THER/DIAG CONCURRENT INF: CPT

## 2025-07-19 PROCEDURE — 96361 HYDRATE IV INFUSION ADD-ON: CPT

## 2025-07-19 PROCEDURE — 36415 COLL VENOUS BLD VENIPUNCTURE: CPT | Performed by: INTERNAL MEDICINE

## 2025-07-19 PROCEDURE — 25000003 PHARM REV CODE 250: Performed by: NURSE PRACTITIONER

## 2025-07-19 PROCEDURE — 85025 COMPLETE CBC W/AUTO DIFF WBC: CPT | Performed by: NURSE PRACTITIONER

## 2025-07-19 PROCEDURE — 25000003 PHARM REV CODE 250: Performed by: STUDENT IN AN ORGANIZED HEALTH CARE EDUCATION/TRAINING PROGRAM

## 2025-07-19 PROCEDURE — 96376 TX/PRO/DX INJ SAME DRUG ADON: CPT

## 2025-07-19 PROCEDURE — 99499 UNLISTED E&M SERVICE: CPT | Mod: ,,, | Performed by: INTERNAL MEDICINE

## 2025-07-19 PROCEDURE — 63600175 PHARM REV CODE 636 W HCPCS: Performed by: NURSE PRACTITIONER

## 2025-07-19 RX ORDER — AMOXICILLIN AND CLAVULANATE POTASSIUM 875; 125 MG/1; MG/1
1 TABLET, FILM COATED ORAL EVERY 12 HOURS
Status: DISCONTINUED | OUTPATIENT
Start: 2025-07-19 | End: 2025-07-19 | Stop reason: HOSPADM

## 2025-07-19 RX ORDER — AMOXICILLIN AND CLAVULANATE POTASSIUM 875; 125 MG/1; MG/1
1 TABLET, FILM COATED ORAL EVERY 12 HOURS
Qty: 14 TABLET | Refills: 0 | Status: SHIPPED | OUTPATIENT
Start: 2025-07-19 | End: 2025-07-26

## 2025-07-19 RX ORDER — HYDROCODONE BITARTRATE AND ACETAMINOPHEN 7.5; 325 MG/1; MG/1
1 TABLET ORAL EVERY 8 HOURS PRN
Qty: 6 TABLET | Refills: 0 | Status: SHIPPED | OUTPATIENT
Start: 2025-07-19 | End: 2025-07-22

## 2025-07-19 RX ORDER — BUTALBITAL, ACETAMINOPHEN AND CAFFEINE 50; 325; 40 MG/1; MG/1; MG/1
1 TABLET ORAL ONCE
Status: COMPLETED | OUTPATIENT
Start: 2025-07-19 | End: 2025-07-19

## 2025-07-19 RX ADMIN — PIPERACILLIN SODIUM AND TAZOBACTAM SODIUM 4.5 G: 4; .5 INJECTION, POWDER, FOR SOLUTION INTRAVENOUS at 05:07

## 2025-07-19 RX ADMIN — HYDROCODONE BITARTRATE AND ACETAMINOPHEN 1 TABLET: 7.5; 325 TABLET ORAL at 03:07

## 2025-07-19 RX ADMIN — ONDANSETRON 4 MG: 2 INJECTION INTRAMUSCULAR; INTRAVENOUS at 10:07

## 2025-07-19 RX ADMIN — FAMOTIDINE 20 MG: 20 INJECTION, SOLUTION INTRAVENOUS at 09:07

## 2025-07-19 RX ADMIN — ONDANSETRON 4 MG: 2 INJECTION INTRAMUSCULAR; INTRAVENOUS at 06:07

## 2025-07-19 RX ADMIN — BUTALBITAL, ACETAMINOPHEN, AND CAFFEINE 1 TABLET: 325; 50; 40 TABLET ORAL at 06:07

## 2025-07-19 RX ADMIN — AMOXICILLIN AND CLAVULANATE POTASSIUM 1 TABLET: 875; 125 TABLET, FILM COATED ORAL at 09:07

## 2025-07-19 NOTE — ASSESSMENT & PLAN NOTE
No acute intra-abdominal process seen on recent CT scan. MRCP is negative. Her symptoms are most likely related to some of her medications or viral illness, on top of IBS-M.     Gastrointestinal Causes  Acute Gastroenteritis (likely viral or bacterial) Given 2-day history of vomiting, diarrhea, and distended cecum with fluid.  Irritable Bowel Syndrome (IBS) Considered due to chronic constipation, but less likely with acute symptoms and abnormal imaging.  Appendicitis Initial concern based on RLQ pain; ruled out after appendix was visualized and appeared normal on repeat CT.  Ovarian Cyst Complication (e.g., rupture or torsion) RLQ pain and trace pelvic fluid could relate to gynecologic pathology.  Abdominal Wall Pain/Post-surgical Adhesions Prior hernia repair with mesh and history of surgeries may contribute to pain exacerbated by movement.  2.  Hepatobiliary Causes (relevant to elevated liver enzymes)  Gallbladder-related disorder despite cholecystectomy Persistent RUQ pain post-cholecystectomy raises concern for biliary dyskinesia or retained stones.  Hepatitis (viral, autoimmune, or drug-induced) Elevated liver enzymes warrant consideration; may present subtly.  Nonalcoholic Fatty Liver Disease (NAFLD) Possible contributor if enzymes are chronically elevated.  Biliary obstruction or cholangitis Less likely with normal WBC, afebrile status, and no imaging suggestive of obstruction.  3. Genitourinary Causes  Urinary Tract Infection/Pyelonephritis Supported by urinalysis with WBCs, bacteria, and leukocyte esterase; flank pain could be consistent.  4. Medication or Substance-Related  Acetaminophen or other hepatotoxic drugs Could explain liver enzyme elevation if taken excessively during symptom onset.

## 2025-07-19 NOTE — PROGRESS NOTES
[facility]  Gastroenterology  Progress Note    Patient Name: Anusha Cardozo  MRN: [id number]  Admission Date: 7/17/2025  Hospital Length of Stay: 0 days  Code Status: Full Code   Attending Provider: Diana Mitchell MD  Consulting Provider: Jesse Ling MD  Primary Care Physician: Shahzad Forte MD  Principal Problem: Right lower quadrant abdominal pain        Subjective:     Interval History:   MRCP is negative for retained stones or masses.   Liver profile is improving. Computed MELD 3.0 unavailable. One or more values for this score either were not found within the given timeframe or did not fit some other criterion.  Computed MELD-Na unavailable. One or more values for this score either were not found within the given timeframe or did not fit some other criterion.  Will order INR today.   Continues to complaint with abdominal pain, requesting pain meds.   Serologic work up still pending.         Objective:     Vital Signs (Most Recent):  Temp: 98 °F (36.7 °C) (07/19/25 0801)  Pulse: 65 (07/19/25 0801)  Resp: 19 (07/19/25 0801)  BP: (!) 115/57 (07/19/25 0801)  SpO2: 97 % (07/19/25 0801) Vital Signs (24h Range):  Temp:  [97.9 °F (36.6 °C)-99 °F (37.2 °C)] 98 °F (36.7 °C)  Pulse:  [57-77] 65  Resp:  [16-19] 19  SpO2:  [95 %-100 %] 97 %  BP: (102-116)/(53-71) 115/57     Weight: 59 kg (130 lb 1.1 oz) (07/17/25 1105)  Body mass index is 23.79 kg/m².      Intake/Output Summary (Last 24 hours) at 7/19/2025 1007  Last data filed at 7/19/2025 0600  Gross per 24 hour   Intake 1690.11 ml   Output --   Net 1690.11 ml       Lines/Drains/Airways       Peripheral Intravenous Line  Duration             Peripheral IV Single Lumen 07/17/25 1129 20 G Left Antecubital 1 day    Peripheral IV Single Lumen 07/18/25 0100 22 G Posterior;Right Forearm 1 day                          Significant Labs:  Acute Hepatitis Panel:   Recent Labs   Lab 07/18/25  4985   HEPBSAG Non-Reactive   HEPAIGM Non-Reactive   HEPCAB  "Non-Reactive     Blood Culture: No results for input(s): "LABBLOO" in the last 48 hours.  CBC:   Recent Labs   Lab 07/17/25  1129 07/17/25  1440 07/17/25  1726 07/18/25  0357 07/19/25  0300   WBC 8.63  --   --  6.48 5.49   HGB 13.0   < > 11.5* 10.9* 10.1*   HCT 37.8   < > 34.3* 32.8* 29.2*     --   --  215 200    < > = values in this interval not displayed.     CMP:   Recent Labs   Lab 07/19/25  0300   GLU 67*   CALCIUM 8.1*   ALBUMIN 3.0*   PROT 5.2*      K 3.7   CO2 22*      BUN 13   CREATININE 0.7   ALKPHOS 50   *   AST 92*   BILITOT 0.6     Coagulation: No results for input(s): "PT", "INR", "APTT" in the last 48 hours.  CRP: No results for input(s): "CRP" in the last 48 hours.  Lipase:   Recent Labs   Lab 07/17/25  1129   LIPASE 13     Liver Function Test:   Recent Labs   Lab 07/17/25  1129 07/18/25  0357 07/19/25  0300   ALT 11 461* 263*   AST 13 316* 92*   ALKPHOS 51 62 50   BILITOT 0.8 0.9 0.6   PROT 7.5 5.8* 5.2*   ALBUMIN 4.3 3.3* 3.0*     Stool C. diff: No results for input(s): "CDIFFICILEAN", "CDIFFTOX" in the last 48 hours.  Stool Culture: No results for input(s): "STOOLCULTURE" in the last 48 hours.  Stool Ova/Cysts/Parasites: No results for input(s): "STLEXAMOCP" in the last 48 hours.  Stool Giardia/Crypto: No results for input(s): "GIARDIAANTIG", "CRSPAG" in the last 48 hours.  Stool WBCs: No results for input(s): "STOOLWBC" in the last 48 hours.      Significant Imaging:  MRCP: I have reviewed all results within the past 24 hours and my personal findings are:  negative.  Assessment/Plan:     GI  * Right lower quadrant abdominal pain  No acute intra-abdominal process seen on recent CT scan. MRCP is negative. Her symptoms are most likely related to some of her medications or viral illness, on top of IBS-M.     Gastrointestinal Causes  Acute Gastroenteritis (likely viral or bacterial) Given 2-day history of vomiting, diarrhea, and distended cecum with fluid.  Irritable Bowel " Syndrome (IBS) Considered due to chronic constipation, but less likely with acute symptoms and abnormal imaging.  Appendicitis Initial concern based on RLQ pain; ruled out after appendix was visualized and appeared normal on repeat CT.  Ovarian Cyst Complication (e.g., rupture or torsion) RLQ pain and trace pelvic fluid could relate to gynecologic pathology.  Abdominal Wall Pain/Post-surgical Adhesions Prior hernia repair with mesh and history of surgeries may contribute to pain exacerbated by movement.  2.  Hepatobiliary Causes (relevant to elevated liver enzymes)  Gallbladder-related disorder despite cholecystectomy Persistent RUQ pain post-cholecystectomy raises concern for biliary dyskinesia or retained stones.  Hepatitis (viral, autoimmune, or drug-induced) Elevated liver enzymes warrant consideration; may present subtly.  Nonalcoholic Fatty Liver Disease (NAFLD) Possible contributor if enzymes are chronically elevated.  Biliary obstruction or cholangitis Less likely with normal WBC, afebrile status, and no imaging suggestive of obstruction.  3. Genitourinary Causes  Urinary Tract Infection/Pyelonephritis Supported by urinalysis with WBCs, bacteria, and leukocyte esterase; flank pain could be consistent.  4. Medication or Substance-Related  Acetaminophen or other hepatotoxic drugs Could explain liver enzyme elevation if taken excessively during symptom onset.    Transaminitis  MRCP has been ordered. Pending results. I also recommend serology work up for viral and autoimmune hepatitis.     See orders.     Transaminitis  -     MRI MRCP is negative.   -     Calprotectin, Stool; Standing  -     High sensitivity CRP; Standing  -     AMA, ASMA, ceruloplasmin, A1AT, Hep A, B and C serology. Results pending.   -     I will recommend following up as outpatient.     Loose stools  Gastrointestinal Causes  Acute Gastroenteritis (likely viral or bacterial) Given 2-day history of vomiting, diarrhea, and distended cecum with  fluid.  Irritable Bowel Syndrome (IBS) Considered due to chronic constipation, but less likely with acute symptoms and abnormal imaging.  Appendicitis Initial concern based on RLQ pain; ruled out after appendix was visualized and appeared normal on repeat CT.  Ovarian Cyst Complication (e.g., rupture or torsion) RLQ pain and trace pelvic fluid could relate to gynecologic pathology.  Abdominal Wall Pain/Post-surgical Adhesions Prior hernia repair with mesh and history of surgeries may contribute to pain exacerbated by movement.  2.  Hepatobiliary Causes (relevant to elevated liver enzymes)  Gallbladder-related disorder despite cholecystectomy Persistent RUQ pain post-cholecystectomy raises concern for biliary dyskinesia or retained stones.  Hepatitis (viral, autoimmune, or drug-induced) Elevated liver enzymes warrant consideration; may present subtly.  Nonalcoholic Fatty Liver Disease (NAFLD) Possible contributor if enzymes are chronically elevated.  Biliary obstruction or cholangitis Less likely with normal WBC, afebrile status, and no imaging suggestive of obstruction.  3. Genitourinary Causes  Urinary Tract Infection/Pyelonephritis Supported by urinalysis with WBCs, bacteria, and leukocyte esterase; flank pain could be consistent.  4. Medication or Substance-Related  Acetaminophen or other hepatotoxic drugs Could explain liver enzyme elevation if taken excessively during symptom onset.    Stools negative so far.         Thank you for your consult. I will sign off. Please contact us if you have any additional questions.    Jesse Ling MD  Gastroenterology  [facility]

## 2025-07-19 NOTE — PLAN OF CARE
[facility]  Discharge Final Note    Primary Care Provider: Shahzad Forte MD    Expected Discharge Date: 7/19/2025    Final Discharge Note (most recent)       Final Note - 07/19/25 1106          Final Note    Assessment Type Final Discharge Note     Anticipated Discharge Disposition Home or Self Care        Post-Acute Status    Discharge Delays None known at this time                     Important Message from Medicare             Contact Info       Shahzad Forte MD   Specialty: Family Medicine   Relationship: PCP - General    [facility]  [address] [facility] [address]   Phone: [phone]       Next Steps: Schedule an appointment as soon as possible for a visit in 3 day(s)    Jesse Ling MD   Specialty: Gastroenterology    [address] [place] [address] [place]  [facility] [address]   Phone: [phone]       Next Steps: Schedule an appointment as soon as possible for a visit    Instructions: Followup Abd Shaun Rasheed MD   Specialty: Transplant, Hepatology    [address] [place] [address]   [place] [address]   Phone: [phone]       Next Steps: Schedule an appointment as soon as possible for a visit    Instructions: Followup LFTs          Discharge home, no home health or dme orders noted.

## 2025-07-19 NOTE — ASSESSMENT & PLAN NOTE
Initially constipated and had large stool with mucus noted, then afterwards has had 1-2 loose/soft yellow stools per day over past two days  Stool culture and stool for C diff ordered but pt had no further diarrhea so not able to be collected

## 2025-07-19 NOTE — ASSESSMENT & PLAN NOTE
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

## 2025-07-19 NOTE — ASSESSMENT & PLAN NOTE
MRCP has been ordered. Pending results. I also recommend serology work up for viral and autoimmune hepatitis.     See orders.     Transaminitis  -     MRI MRCP is negative.   -     Calprotectin, Stool; Standing  -     High sensitivity CRP; Standing  -     AMA, ASMA, ceruloplasmin, A1AT, Hep A, B and C serology. Results pending.   -     I will recommend following up as outpatient.

## 2025-07-19 NOTE — ASSESSMENT & PLAN NOTE
- mild elevation in LFTs since admission, pt reports cholecystectomy in 2022  - CT shows mild periportal edema but no other acute findings. Repeat CT with no acute findings. MRCP wnl   -tylenol, etoh, acute hepatitis serologies negative   - GI consulted, autoimmune hepatitis serologies pending   - LFTs downtrending. Tbili and INR wnl   - outpatient hepatology followup

## 2025-07-19 NOTE — NURSING
Discharge instructions received and reviewed with pt and family at bedside.  Pt voiced understanding and all questions answered to satisfaction.  Stressed importance to making and keeping all follow up appointments.  Medications sent to pt pharmacy and reviewed with pt.  Tele monitor removed and brought to monitor tech.  IV d/c'd with tip intact, pressure dressing applied.  Pt walked down to lobby with mom to be discharged home.

## 2025-07-19 NOTE — ASSESSMENT & PLAN NOTE
Gastrointestinal Causes  Acute Gastroenteritis (likely viral or bacterial) Given 2-day history of vomiting, diarrhea, and distended cecum with fluid.  Irritable Bowel Syndrome (IBS) Considered due to chronic constipation, but less likely with acute symptoms and abnormal imaging.  Appendicitis Initial concern based on RLQ pain; ruled out after appendix was visualized and appeared normal on repeat CT.  Ovarian Cyst Complication (e.g., rupture or torsion) RLQ pain and trace pelvic fluid could relate to gynecologic pathology.  Abdominal Wall Pain/Post-surgical Adhesions Prior hernia repair with mesh and history of surgeries may contribute to pain exacerbated by movement.  2.  Hepatobiliary Causes (relevant to elevated liver enzymes)  Gallbladder-related disorder despite cholecystectomy Persistent RUQ pain post-cholecystectomy raises concern for biliary dyskinesia or retained stones.  Hepatitis (viral, autoimmune, or drug-induced) Elevated liver enzymes warrant consideration; may present subtly.  Nonalcoholic Fatty Liver Disease (NAFLD) Possible contributor if enzymes are chronically elevated.  Biliary obstruction or cholangitis Less likely with normal WBC, afebrile status, and no imaging suggestive of obstruction.  3. Genitourinary Causes  Urinary Tract Infection/Pyelonephritis Supported by urinalysis with WBCs, bacteria, and leukocyte esterase; flank pain could be consistent.  4. Medication or Substance-Related  Acetaminophen or other hepatotoxic drugs Could explain liver enzyme elevation if taken excessively during symptom onset.    Stools negative so far.

## 2025-07-19 NOTE — SUBJECTIVE & OBJECTIVE
"    Subjective:     Interval History:   MRCP is negative for retained stones or masses.   Liver profile is improving. Computed MELD 3.0 unavailable. One or more values for this score either were not found within the given timeframe or did not fit some other criterion.  Computed MELD-Na unavailable. One or more values for this score either were not found within the given timeframe or did not fit some other criterion.  Will order INR today.   Continues to complaint with abdominal pain, requesting pain meds.   Serologic work up still pending.         Objective:     Vital Signs (Most Recent):  Temp: 98 °F (36.7 °C) (07/19/25 0801)  Pulse: 65 (07/19/25 0801)  Resp: 19 (07/19/25 0801)  BP: (!) 115/57 (07/19/25 0801)  SpO2: 97 % (07/19/25 0801) Vital Signs (24h Range):  Temp:  [97.9 °F (36.6 °C)-99 °F (37.2 °C)] 98 °F (36.7 °C)  Pulse:  [57-77] 65  Resp:  [16-19] 19  SpO2:  [95 %-100 %] 97 %  BP: (102-116)/(53-71) 115/57     Weight: 59 kg (130 lb 1.1 oz) (07/17/25 1105)  Body mass index is 23.79 kg/m².      Intake/Output Summary (Last 24 hours) at 7/19/2025 1007  Last data filed at 7/19/2025 0600  Gross per 24 hour   Intake 1690.11 ml   Output --   Net 1690.11 ml       Lines/Drains/Airways       Peripheral Intravenous Line  Duration             Peripheral IV Single Lumen 07/17/25 1129 20 G Left Antecubital 1 day    Peripheral IV Single Lumen 07/18/25 0100 22 G Posterior;Right Forearm 1 day                          Significant Labs:  Acute Hepatitis Panel:   Recent Labs   Lab 07/18/25  0357   HEPBSAG Non-Reactive   HEPAIGM Non-Reactive   HEPCAB Non-Reactive     Blood Culture: No results for input(s): "LABBLOO" in the last 48 hours.  CBC:   Recent Labs   Lab 07/17/25  1129 07/17/25  1440 07/17/25  1726 07/18/25  0357 07/19/25  0300   WBC 8.63  --   --  6.48 5.49   HGB 13.0   < > 11.5* 10.9* 10.1*   HCT 37.8   < > 34.3* 32.8* 29.2*     --   --  215 200    < > = values in this interval not displayed.     CMP:   Recent " "Labs   Lab 07/19/25  0300   GLU 67*   CALCIUM 8.1*   ALBUMIN 3.0*   PROT 5.2*      K 3.7   CO2 22*      BUN 13   CREATININE 0.7   ALKPHOS 50   *   AST 92*   BILITOT 0.6     Coagulation: No results for input(s): "PT", "INR", "APTT" in the last 48 hours.  CRP: No results for input(s): "CRP" in the last 48 hours.  Lipase:   Recent Labs   Lab 07/17/25  1129   LIPASE 13     Liver Function Test:   Recent Labs   Lab 07/17/25  1129 07/18/25  0357 07/19/25  0300   ALT 11 461* 263*   AST 13 316* 92*   ALKPHOS 51 62 50   BILITOT 0.8 0.9 0.6   PROT 7.5 5.8* 5.2*   ALBUMIN 4.3 3.3* 3.0*     Stool C. diff: No results for input(s): "CDIFFICILEAN", "CDIFFTOX" in the last 48 hours.  Stool Culture: No results for input(s): "STOOLCULTURE" in the last 48 hours.  Stool Ova/Cysts/Parasites: No results for input(s): "STLEXAMOCP" in the last 48 hours.  Stool Giardia/Crypto: No results for input(s): "GIARDIAANTIG", "CRSPAG" in the last 48 hours.  Stool WBCs: No results for input(s): "STOOLWBC" in the last 48 hours.      Significant Imaging:  MRCP: I have reviewed all results within the past 24 hours and my personal findings are:  negative.  "

## 2025-07-19 NOTE — PLAN OF CARE
Problem: Adult Inpatient Plan of Care  Goal: Plan of Care Review  Outcome: Progressing  Goal: Patient-Specific Goal (Individualized)  Outcome: Progressing  Goal: Absence of Hospital-Acquired Illness or Injury  Outcome: Progressing  Goal: Optimal Comfort and Wellbeing  Outcome: Progressing  Goal: Readiness for Transition of Care  Outcome: Progressing   POC reviewed with pt. Pt verbalizes understanding of POC. No questions at this time.  AAOx4. NADN.  NSR on cardiac monitor.  Pt remains free of falls.  No complaints at this time.  Safety measures in place. Will continue to monitor.  Informed pt to call for assistance before getting up. Pt verbalizes understanding.  Hourly rounding and chart check complete.

## 2025-07-19 NOTE — ASSESSMENT & PLAN NOTE
"Initial CT abd with "small to moderate volume suspected hemorrhagic fluid in pelvis, possible diarrheal illness, nonspecific periportal edema" Subsequent pelvic ultrasound showed "trace free fluid in pelvis, no hemoperitoneum demonstrated"   General surgery consulted per ED  and recommended serial abd exams and repeat CT abd with PO and IV contrast   Repeat CT Abd/Pelvis with PO and IV contrast showed no acute findings, no appendicitis. She had some elevation in AST, ALT after admission, bili remained wnl. Gen Surg recommended GI evaluation for possible IBS/IBD and LFT elevation. Acute hepatitis, APAP and EtoH levels wnl. CRP, Ceruloplasmin wnl. GI recommended MRCP and autoimmune hepatitis serologies. MRCP showed no acute findings. GI suspect abd pain could be due to medication vs. Viral illness vs. IBS-M. Pain could also be in setting of UTI.        "

## 2025-07-19 NOTE — PLAN OF CARE
"10:31 PM  7/18/25    CC: RUQ pain, NVD, UTI  Neuro: A/Ox4  Cardiac: NSR on tele  Resp: RA  GI/: LBM 7/17  Skin: intact  Pain: 7/10 RUQ pain, PRN pain meds given  Mobility: SBA  IV: 20g L AC and 22g R ant FA. IV Abx and LR @ 75  Plan of Care: POC reviewed with pt. Pt verbalizes understanding of POC. No questions at this time.  Safety: bed in lowest position, purposeful rounding, call light in reach, personal items in reach, call for assistance when needed. Pt verbalizes understanding. Will continue to monitor.       BP (!) 102/55 (BP Location: Left arm, Patient Position: Lying)   Pulse 63   Temp 98 °F (36.7 °C) (Oral)   Resp 16   Ht 5' 2" (1.575 m)   Wt 59 kg (130 lb 1.1 oz)   LMP 07/03/2025 (Approximate)   SpO2 98%   Breastfeeding No   BMI 23.79 kg/m²     Mery Solis RN  07/18/2025    "

## 2025-07-19 NOTE — DISCHARGE SUMMARY
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  She complains of right lower quadrant and right flank pain which started 2-3 days ago and has progressively gotten worse.  She also reports 2 days of nausea, vomiting, dry heaving.  She shows a picture of formed stool with some mucus noted which she had 2 days ago, does report loose yellow colored mucus like stool 1 to 2 times a day for the past 2 days.  Denies fever, chills, dizziness, lightheadedness, chest pain, shortness of breath, edema, dysuria.  She does report urinating small amounts more frequently than usual.  She states that her pain is severe and constant, exacerbated with movement.  She reports that she previously had cholecystectomy and umbilical hernia repair with mesh.  She states that she has been taking ibuprofen 800-1600 mg per day for chronic neck and back pain.  She denies any previous history of peptic ulcer disease, states that she has never had EGD in the past.  She has been unable to keep any food or fluids down for past 2 days.  She does have history of constipation in the past.  She denies any history of inflammatory bowel disease, reports that she had a colonoscopy sometime last year.  While in ED,  afebrile, vital signs stable on room air.  Lab workup insignificant aside from UTI with   Cloudy yellow urine, negative nitrites, 1+ leukocyte esterase, 30 WBCs, moderate bacteria.  Urine culture in progress.   Urine hCG negative.  EKG-- normal sinus rhythm, heart rate 95, .  CT abdomen and pelvis with IV contrast /no oral contrast was done which showed small-to-moderate volume suspected hemorrhagic fluid in pelvis /pelvic ultrasound recommended; possible diarrheal illness, periportal edema, surgically absent gallbladder, no evidence of dilated bile ducts, no pancreatic mass or peripancreatic fat stranding, no hydronephrosis or obstructive uropathy, no evidence of bladder wall thickening, bilateral ovarian cysts and follicles noted with small-to-moderate volume suspected hemorrhagic fluid in pelvis, no ascites, no free air, appendix was not identified.  Transvaginal ultrasound was done which showed trace free fluid in pelvis, no hemoperitoneum-- radiology interpretation states that previous findings on CT likely represent combination of diarrheal illness with fluid in the cecum deep in the pelvis and displaced bladder.  General surgery was consulted and patient evaluated by Dr. Norma Lindquist, who recommended observation overnight and to repeat CT abdomen with oral contrast, plan for possible appendectomy if still cannot visualize appendix and/or having persistent abdominal pain. While in ED, she was given IV morphine 4 mg, IV Zofran, IV Zosyn IV dilaudid x 2 doses (1 mg each), IV toradol, NS 1L bolus, and  LR 1L bolus. Hospital Medicine consulted for admission due to abdominal pain.    * No surgery found *      Hospital Course:   She was continued on empiric IV Zosyn. Repeat CT Abd/Pelvis with PO and IV contrast showed no acute findings, no appendicitis. She had some elevation in AST, ALT after admission, bili remained wnl. Gen Surg recommended GI evaluation for possible IBS/IBD and LFT elevation. Acute  "hepatitis, APAP and EtoH levels wnl. CRP, Ceruloplasmin wnl. GI recommended MRCP and autoimmune hepatitis serologies. MRCP showed no acute findings. GI suspect abd pain could be due to medication vs. Viral illness vs. IBS-M. Pt diet was advanced which she was able to tolerate without worsening pain or vomiting. LFTs downtrending, Tbili and INR wnl, other labs fairly unremarkable. Urine cx with CONS, pt was transitioned to PO Augmentin to complete 7d course of abx. Pt seen and examined on day of discharge and appears stable for discharge home today. Followup with PCP within 3-5 days, GI and Hepatology asap for followup of autoimmune work up and transaminitis, amb referrals sent.      Goals of Care Treatment Preferences:  Code Status: Full Code         Consults:   Consults (From admission, onward)          Status Ordering Provider     Inpatient consult to Gastroenterology  Once        Provider:  Jesse Ling MD    Completed SHALOM ÁLVAREZ S            Assessment & Plan  Right lower quadrant abdominal pain  Nausea  Initial CT abd with "small to moderate volume suspected hemorrhagic fluid in pelvis, possible diarrheal illness, nonspecific periportal edema" Subsequent pelvic ultrasound showed "trace free fluid in pelvis, no hemoperitoneum demonstrated"   General surgery consulted per ED  and recommended serial abd exams and repeat CT abd with PO and IV contrast   Repeat CT Abd/Pelvis with PO and IV contrast showed no acute findings, no appendicitis. She had some elevation in AST, ALT after admission, bili remained wnl. Gen Surg recommended GI evaluation for possible IBS/IBD and LFT elevation. Acute hepatitis, APAP and EtoH levels wnl. CRP, Ceruloplasmin wnl. GI recommended MRCP and autoimmune hepatitis serologies. MRCP showed no acute findings. GI suspect abd pain could be due to medication vs. Viral illness vs. IBS-M. Pain could also be in setting of UTI.        Transaminitis  - mild elevation in LFTs since " admission, pt reports cholecystectomy in 2022  - CT shows mild periportal edema but no other acute findings. Repeat CT with no acute findings. MRCP wnl   -tylenol, etoh, acute hepatitis serologies negative   - GI consulted, autoimmune hepatitis serologies pending   - LFTs downtrending. 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 [employer]    Final Active Diagnoses:    Diagnosis Date Noted POA    PRINCIPAL PROBLEM:  Right lower quadrant abdominal pain [R10.31] 07/17/2025 Yes    Transaminitis [R74.01] 07/18/2025 No    Acute cystitis [N30.00] 07/17/2025 Yes    Loose stools [R19.5] 07/17/2025 Yes    Nausea [R11.0] 07/17/2025 Yes    Anxiety [F41.9] 07/17/2025 Yes      Problems Resolved During this Admission:    Diagnosis Date Noted Date Resolved POA    Headache [R51.9] 07/17/2025 07/18/2025 Yes       Discharged Condition: good    Disposition: Home or Self Care    Follow Up:   Follow-up Information       Shahzad Forte MD. Schedule an appointment as soon as possible for a visit in 3 day(s).    Specialty: Family Medicine  Contact information:  [address] [facility] [address] [phone]               Jesse Ling MD. Schedule an appointment as soon as possible for a visit.    Specialty: Gastroenterology  Why: Followup Abd Pain  Contact information:  [address] [facility] [address]  [phone]               Shaun Ramírez MD. Schedule  an appointment as soon as possible for a visit.    Specialties: Transplant, Hepatology  Why: Followup LFTs  Contact information:  [address] [place] [address] [address] Daina BOSS [address] [phone]                           Patient Instructions:      Ambulatory referral/consult to Gastroenterology   Standing Status: Future   Referral Priority: Routine Referral Type: Consultation   Referral Reason: Specialty Services Required   Requested Specialty: Gastroenterology   Number of Visits Requested: 1     Ambulatory referral/consult to Hepatology   Standing Status: Future   Referral Priority: Routine Referral Type: Consultation   Referral Reason: Specialty Services Required   Requested Specialty: Hepatology   Number of Visits Requested: 1     Diet Adult Regular     Notify your health care provider if you experience any of the following:  persistent nausea and vomiting or diarrhea     Notify your health care provider if you experience any of the following:  temperature >100.4     Activity as tolerated       Significant Diagnostic Studies:  see Hospital Course     Pending Diagnostic Studies:       Procedure Component Value Units Date/Time    Alpha 1 Antitrypsin Phenotype [[id number]] Collected: 07/18/25 1747    Order Status: Sent Lab Status: In process Updated: 07/18/25 1751    Specimen: Blood     Anti-smooth muscle antibody [[id number]] Collected: 07/18/25 1747    Order Status: Sent Lab Status: In process Updated: 07/18/25 1751    Specimen: Blood     Antimitochondrial Antibody [[id number]] Collected: 07/18/25 1747    Order Status: Sent Lab Status: In process Updated: 07/18/25 1751    Specimen: Blood     Calprotectin, Stool [[id number]]     Order Status: Sent Lab Status: No result     Specimen: Stool            Medications:  Reconciled Home Medications:      Medication List        START taking these medications      amoxicillin-clavulanate 875-125mg 875-125 mg per tablet  Commonly known as: AUGMENTIN  Take 1 tablet by mouth every  12 (twelve) hours. for 7 days     HYDROcodone-acetaminophen 7.5-325 mg per tablet  Commonly known as: NORCO  Take 1 tablet by mouth every 8 (eight) hours as needed for Pain.            CONTINUE taking these medications      albuterol 90 mcg/actuation inhaler  Commonly known as: PROVENTIL/VENTOLIN HFA  Inhale 2 puffs into the lungs every 6 (six) hours as needed for Wheezing. Rescue     fluticasone propionate 50 mcg/actuation nasal spray  Commonly known as: FLONASE  1 spray (50 mcg total) by Each Nostril route once daily.     * AdderalL 10 mg Tab  Generic drug: dextroamphetamine-amphetamine     * MYDAYIS 37.5 mg Ct24  Generic drug: dextroamphetamine-amphetamine           * This list has 2 medication(s) that are the same as other medications prescribed for you. Read the directions carefully, and ask your doctor or other care provider to review them with you.                ASK your doctor about these medications      ergocalciferol 50,000 unit Cap  Commonly known as: ERGOCALCIFEROL  Take 1 capsule (50,000 Units total) by mouth every 7 days.     methocarbamoL 500 MG Tab  Commonly known as: Robaxin  Take 1 tablet (500 mg total) by mouth 3 (three) times daily as needed.     prenatal 105-iron-folic ac-dha 30 mg iron- 1.4 mg-300 mg Cmpk  Take by mouth.     venlafaxine 75 MG 24 hr capsule  Commonly known as: EFFEXOR-XR  Take 75 mg by mouth.              Indwelling Lines/Drains at time of discharge:   Lines/Drains/Airways       None                       Time spent on the discharge of patient: 32 minutes         Diaan Mitchell MD  Department of Hospital Medicine  [facility]

## 2025-07-22 ENCOUNTER — PATIENT OUTREACH (OUTPATIENT)
Dept: ADMINISTRATIVE | Facility: CLINIC | Age: 35
End: 2025-07-22
Payer: COMMERCIAL

## 2025-07-22 LAB
A1AT PHENOTYP SERPL-IMP: NORMAL BANDS
A1AT SERPL NEPH-MCNC: 122 MG/DL (ref 100–190)
MITOCHONDRIA AB TITR SER IF: NORMAL {TITER}
SMOOTH MUSCLE AB TITR SER IF: NORMAL {TITER}

## 2025-07-22 RX ORDER — LISDEXAMFETAMINE DIMESYLATE 60 MG/1
60 CAPSULE ORAL EVERY MORNING
COMMUNITY
Start: 2025-07-09

## 2025-07-22 RX ORDER — DESVENLAFAXINE 50 MG/1
50 TABLET, FILM COATED, EXTENDED RELEASE ORAL DAILY
COMMUNITY
Start: 2025-07-14

## 2025-07-22 NOTE — PROGRESS NOTES
[facility]3 nurse spoke with Anusha Cardozo for a TCC post hospital discharge follow up call. The patient does not have a scheduled HOSFU appointment with Shahzad Forte MD within 5-7 days post hospital discharge date 7/19. C3 nurse was unable to schedule HOSFU appointment in Epic. Patient does not have an [facility] PCP.